# Patient Record
Sex: MALE | Race: WHITE | Employment: FULL TIME | RURAL
[De-identification: names, ages, dates, MRNs, and addresses within clinical notes are randomized per-mention and may not be internally consistent; named-entity substitution may affect disease eponyms.]

---

## 2021-06-10 ENCOUNTER — APPOINTMENT (OUTPATIENT)
Dept: GENERAL RADIOLOGY | Age: 66
DRG: 640 | End: 2021-06-10
Attending: INTERNAL MEDICINE
Payer: COMMERCIAL

## 2021-06-10 ENCOUNTER — APPOINTMENT (OUTPATIENT)
Dept: CT IMAGING | Age: 66
DRG: 640 | End: 2021-06-10
Attending: INTERNAL MEDICINE
Payer: COMMERCIAL

## 2021-06-10 ENCOUNTER — HOSPITAL ENCOUNTER (INPATIENT)
Age: 66
LOS: 4 days | Discharge: HOME | DRG: 640 | End: 2021-06-14
Attending: EMERGENCY MEDICINE | Admitting: INTERNAL MEDICINE
Payer: COMMERCIAL

## 2021-06-10 DIAGNOSIS — G25.0 BENIGN FAMILIAL TREMOR: Primary | ICD-10-CM

## 2021-06-10 DIAGNOSIS — E11.10 DIABETIC KETOACIDOSIS WITHOUT COMA ASSOCIATED WITH TYPE 2 DIABETES MELLITUS (HCC): ICD-10-CM

## 2021-06-10 DIAGNOSIS — K85.20 ALCOHOL-INDUCED ACUTE PANCREATITIS, UNSPECIFIED COMPLICATION STATUS: ICD-10-CM

## 2021-06-10 PROBLEM — F10.10 ETOH ABUSE: Status: ACTIVE | Noted: 2021-06-10

## 2021-06-10 PROBLEM — E11.9 TYPE 2 DIABETES MELLITUS, WITHOUT LONG-TERM CURRENT USE OF INSULIN (HCC): Chronic | Status: ACTIVE | Noted: 2021-06-10

## 2021-06-10 PROBLEM — E78.5 HYPERLIPIDEMIA: Chronic | Status: ACTIVE | Noted: 2021-06-10

## 2021-06-10 LAB
ALBUMIN SERPL-MCNC: 3.9 G/DL (ref 3.5–5)
ALBUMIN/GLOB SERPL: 1 {RATIO} (ref 1.1–2.2)
ALP SERPL-CCNC: 108 U/L (ref 45–117)
ALT SERPL-CCNC: 83 U/L (ref 12–78)
ANION GAP SERPL CALC-SCNC: 19 MMOL/L (ref 5–15)
ANION GAP SERPL CALC-SCNC: 21 MMOL/L (ref 5–15)
APPEARANCE UR: CLEAR
AST SERPL-CCNC: 83 U/L (ref 15–37)
BACTERIA URNS QL MICRO: NEGATIVE /HPF
BASE DEFICIT BLDV-SCNC: 4.6 MMOL/L
BASOPHILS # BLD: 0 K/UL (ref 0–0.1)
BASOPHILS NFR BLD: 0 % (ref 0–1)
BDY SITE: ABNORMAL
BILIRUB SERPL-MCNC: 1.9 MG/DL (ref 0.2–1)
BILIRUB UR QL CFM: NEGATIVE
BUN SERPL-MCNC: 21 MG/DL (ref 6–20)
BUN SERPL-MCNC: 21 MG/DL (ref 6–20)
BUN/CREAT SERPL: 17 (ref 12–20)
BUN/CREAT SERPL: 19 (ref 12–20)
CALCIUM SERPL-MCNC: 8.8 MG/DL (ref 8.5–10.1)
CALCIUM SERPL-MCNC: 9.7 MG/DL (ref 8.5–10.1)
CHLORIDE SERPL-SCNC: 90 MMOL/L (ref 97–108)
CHLORIDE SERPL-SCNC: 95 MMOL/L (ref 97–108)
CO2 SERPL-SCNC: 18 MMOL/L (ref 21–32)
CO2 SERPL-SCNC: 19 MMOL/L (ref 21–32)
COLOR UR: ABNORMAL
COMMENT, HOLDF: NORMAL
CREAT SERPL-MCNC: 1.08 MG/DL (ref 0.7–1.3)
CREAT SERPL-MCNC: 1.24 MG/DL (ref 0.7–1.3)
DIFFERENTIAL METHOD BLD: ABNORMAL
EOSINOPHIL # BLD: 0 K/UL (ref 0–0.4)
EOSINOPHIL NFR BLD: 0 % (ref 0–7)
EPITH CASTS URNS QL MICRO: ABNORMAL /LPF
ERYTHROCYTE [DISTWIDTH] IN BLOOD BY AUTOMATED COUNT: 12.3 % (ref 11.5–14.5)
EST. AVERAGE GLUCOSE BLD GHB EST-MCNC: 137 MG/DL
GLOBULIN SER CALC-MCNC: 4.1 G/DL (ref 2–4)
GLUCOSE BLD STRIP.AUTO-MCNC: 164 MG/DL (ref 65–117)
GLUCOSE BLD STRIP.AUTO-MCNC: 194 MG/DL (ref 65–117)
GLUCOSE BLD STRIP.AUTO-MCNC: 355 MG/DL (ref 65–117)
GLUCOSE BLD STRIP.AUTO-MCNC: 81 MG/DL (ref 65–117)
GLUCOSE SERPL-MCNC: 183 MG/DL (ref 65–100)
GLUCOSE SERPL-MCNC: 344 MG/DL (ref 65–100)
GLUCOSE UR STRIP.AUTO-MCNC: >1000 MG/DL
HBA1C MFR BLD: 6.4 % (ref 4–5.6)
HCO3 BLDV-SCNC: 18 MMOL/L (ref 23–28)
HCT VFR BLD AUTO: 38.3 % (ref 36.6–50.3)
HGB BLD-MCNC: 14.3 G/DL (ref 12.1–17)
HGB UR QL STRIP: ABNORMAL
IMM GRANULOCYTES # BLD AUTO: 0 K/UL (ref 0–0.04)
IMM GRANULOCYTES NFR BLD AUTO: 1 % (ref 0–0.5)
KETONES UR QL STRIP.AUTO: 40 MG/DL
LACTATE SERPL-SCNC: 2.3 MMOL/L (ref 0.4–2)
LEUKOCYTE ESTERASE UR QL STRIP.AUTO: NEGATIVE
LIPASE SERPL-CCNC: 1689 U/L (ref 73–393)
LYMPHOCYTES # BLD: 0.4 K/UL (ref 0.8–3.5)
LYMPHOCYTES NFR BLD: 5 % (ref 12–49)
MCH RBC QN AUTO: 39.3 PG (ref 26–34)
MCHC RBC AUTO-ENTMCNC: 37.3 G/DL (ref 30–36.5)
MCV RBC AUTO: 105.2 FL (ref 80–99)
MONOCYTES # BLD: 0.5 K/UL (ref 0–1)
MONOCYTES NFR BLD: 7 % (ref 5–13)
NEUTS SEG # BLD: 6.5 K/UL (ref 1.8–8)
NEUTS SEG NFR BLD: 88 % (ref 32–75)
NITRITE UR QL STRIP.AUTO: NEGATIVE
NRBC # BLD: 0 K/UL (ref 0–0.01)
NRBC BLD-RTO: 0 PER 100 WBC
PCO2 BLDV: 25.9 MMHG (ref 41–51)
PH BLDV: 7.45 [PH] (ref 7.32–7.42)
PH UR STRIP: 6 [PH] (ref 5–8)
PLATELET # BLD AUTO: 156 K/UL (ref 150–400)
PMV BLD AUTO: 9.8 FL (ref 8.9–12.9)
PO2 BLDV: 81 MMHG (ref 25–40)
POTASSIUM SERPL-SCNC: 3.9 MMOL/L (ref 3.5–5.1)
POTASSIUM SERPL-SCNC: 4.1 MMOL/L (ref 3.5–5.1)
PROT SERPL-MCNC: 8 G/DL (ref 6.4–8.2)
PROT UR STRIP-MCNC: >300 MG/DL
RBC # BLD AUTO: 3.64 M/UL (ref 4.1–5.7)
RBC #/AREA URNS HPF: ABNORMAL /HPF (ref 0–5)
SAMPLES BEING HELD,HOLD: NORMAL
SAO2 % BLDV: 97 % (ref 65–88)
SAO2% DEVICE SAO2% SENSOR NAME: ABNORMAL
SERVICE CMNT-IMP: ABNORMAL
SERVICE CMNT-IMP: NORMAL
SODIUM SERPL-SCNC: 129 MMOL/L (ref 136–145)
SODIUM SERPL-SCNC: 133 MMOL/L (ref 136–145)
SP GR UR REFRACTOMETRY: 1.02 (ref 1–1.03)
SPECIMEN SITE: ABNORMAL
TROPONIN I SERPL-MCNC: <0.05 NG/ML
UROBILINOGEN UR QL STRIP.AUTO: 1 EU/DL (ref 0.2–1)
WBC # BLD AUTO: 7.4 K/UL (ref 4.1–11.1)
WBC URNS QL MICRO: ABNORMAL /HPF (ref 0–4)

## 2021-06-10 PROCEDURE — 80053 COMPREHEN METABOLIC PANEL: CPT

## 2021-06-10 PROCEDURE — 74011636637 HC RX REV CODE- 636/637: Performed by: EMERGENCY MEDICINE

## 2021-06-10 PROCEDURE — 84484 ASSAY OF TROPONIN QUANT: CPT

## 2021-06-10 PROCEDURE — 71046 X-RAY EXAM CHEST 2 VIEWS: CPT

## 2021-06-10 PROCEDURE — 85025 COMPLETE CBC W/AUTO DIFF WBC: CPT

## 2021-06-10 PROCEDURE — 74011250637 HC RX REV CODE- 250/637: Performed by: INTERNAL MEDICINE

## 2021-06-10 PROCEDURE — 82803 BLOOD GASES ANY COMBINATION: CPT

## 2021-06-10 PROCEDURE — 82010 KETONE BODYS QUAN: CPT

## 2021-06-10 PROCEDURE — 93005 ELECTROCARDIOGRAM TRACING: CPT

## 2021-06-10 PROCEDURE — 83605 ASSAY OF LACTIC ACID: CPT

## 2021-06-10 PROCEDURE — 99284 EMERGENCY DEPT VISIT MOD MDM: CPT

## 2021-06-10 PROCEDURE — 83036 HEMOGLOBIN GLYCOSYLATED A1C: CPT

## 2021-06-10 PROCEDURE — 83690 ASSAY OF LIPASE: CPT

## 2021-06-10 PROCEDURE — 96361 HYDRATE IV INFUSION ADD-ON: CPT

## 2021-06-10 PROCEDURE — 96375 TX/PRO/DX INJ NEW DRUG ADDON: CPT

## 2021-06-10 PROCEDURE — 96374 THER/PROPH/DIAG INJ IV PUSH: CPT

## 2021-06-10 PROCEDURE — 74011250636 HC RX REV CODE- 250/636: Performed by: INTERNAL MEDICINE

## 2021-06-10 PROCEDURE — 82962 GLUCOSE BLOOD TEST: CPT

## 2021-06-10 PROCEDURE — 36415 COLL VENOUS BLD VENIPUNCTURE: CPT

## 2021-06-10 PROCEDURE — 65270000029 HC RM PRIVATE

## 2021-06-10 PROCEDURE — 74176 CT ABD & PELVIS W/O CONTRAST: CPT

## 2021-06-10 PROCEDURE — 74011636637 HC RX REV CODE- 636/637: Performed by: INTERNAL MEDICINE

## 2021-06-10 PROCEDURE — 81001 URINALYSIS AUTO W/SCOPE: CPT

## 2021-06-10 PROCEDURE — 74011250636 HC RX REV CODE- 250/636: Performed by: EMERGENCY MEDICINE

## 2021-06-10 RX ORDER — ALPRAZOLAM 0.5 MG/1
0.5 TABLET ORAL EVERY 8 HOURS
Status: DISCONTINUED | OUTPATIENT
Start: 2021-06-10 | End: 2021-06-12

## 2021-06-10 RX ORDER — ACETAMINOPHEN 325 MG/1
650 TABLET ORAL
Status: DISCONTINUED | OUTPATIENT
Start: 2021-06-10 | End: 2021-06-14 | Stop reason: HOSPADM

## 2021-06-10 RX ORDER — SODIUM CHLORIDE 0.9 % (FLUSH) 0.9 %
5-40 SYRINGE (ML) INJECTION EVERY 8 HOURS
Status: DISCONTINUED | OUTPATIENT
Start: 2021-06-10 | End: 2021-06-14 | Stop reason: HOSPADM

## 2021-06-10 RX ORDER — PROPRANOLOL HYDROCHLORIDE 10 MG/1
10 TABLET ORAL 2 TIMES DAILY
Status: DISCONTINUED | OUTPATIENT
Start: 2021-06-10 | End: 2021-06-14 | Stop reason: HOSPADM

## 2021-06-10 RX ORDER — ACETAMINOPHEN 650 MG/1
650 SUPPOSITORY RECTAL
Status: DISCONTINUED | OUTPATIENT
Start: 2021-06-10 | End: 2021-06-14 | Stop reason: HOSPADM

## 2021-06-10 RX ORDER — POLYETHYLENE GLYCOL 3350 17 G/17G
17 POWDER, FOR SOLUTION ORAL DAILY PRN
Status: DISCONTINUED | OUTPATIENT
Start: 2021-06-10 | End: 2021-06-14 | Stop reason: HOSPADM

## 2021-06-10 RX ORDER — SODIUM CHLORIDE 0.9 % (FLUSH) 0.9 %
5-40 SYRINGE (ML) INJECTION AS NEEDED
Status: DISCONTINUED | OUTPATIENT
Start: 2021-06-10 | End: 2021-06-14 | Stop reason: HOSPADM

## 2021-06-10 RX ORDER — LOSARTAN POTASSIUM AND HYDROCHLOROTHIAZIDE 25; 100 MG/1; MG/1
TABLET ORAL
COMMUNITY
Start: 2021-05-20 | End: 2021-06-14

## 2021-06-10 RX ORDER — LORAZEPAM 2 MG/ML
0.5 INJECTION INTRAMUSCULAR
Status: COMPLETED | OUTPATIENT
Start: 2021-06-10 | End: 2021-06-10

## 2021-06-10 RX ORDER — FENOFIBRATE 145 MG/1
145 TABLET, COATED ORAL DAILY
Status: DISCONTINUED | OUTPATIENT
Start: 2021-06-11 | End: 2021-06-14 | Stop reason: HOSPADM

## 2021-06-10 RX ORDER — MAGNESIUM SULFATE 100 %
4 CRYSTALS MISCELLANEOUS AS NEEDED
Status: DISCONTINUED | OUTPATIENT
Start: 2021-06-10 | End: 2021-06-14 | Stop reason: HOSPADM

## 2021-06-10 RX ORDER — ONDANSETRON 2 MG/ML
4 INJECTION INTRAMUSCULAR; INTRAVENOUS
Status: DISCONTINUED | OUTPATIENT
Start: 2021-06-10 | End: 2021-06-14 | Stop reason: HOSPADM

## 2021-06-10 RX ORDER — SODIUM CHLORIDE 9 MG/ML
150 INJECTION, SOLUTION INTRAVENOUS CONTINUOUS
Status: DISCONTINUED | OUTPATIENT
Start: 2021-06-10 | End: 2021-06-11

## 2021-06-10 RX ORDER — GLYBURIDE-METFORMIN HYDROCHLORIDE 5; 500 MG/1; MG/1
TABLET ORAL
COMMUNITY
End: 2021-06-14

## 2021-06-10 RX ORDER — ONDANSETRON 2 MG/ML
4 INJECTION INTRAMUSCULAR; INTRAVENOUS
Status: COMPLETED | OUTPATIENT
Start: 2021-06-10 | End: 2021-06-10

## 2021-06-10 RX ORDER — ENOXAPARIN SODIUM 100 MG/ML
40 INJECTION SUBCUTANEOUS DAILY
Status: DISCONTINUED | OUTPATIENT
Start: 2021-06-11 | End: 2021-06-14

## 2021-06-10 RX ORDER — FAMOTIDINE 20 MG/1
20 TABLET, FILM COATED ORAL EVERY EVENING
Status: DISCONTINUED | OUTPATIENT
Start: 2021-06-10 | End: 2021-06-14 | Stop reason: HOSPADM

## 2021-06-10 RX ORDER — INSULIN LISPRO 100 [IU]/ML
INJECTION, SOLUTION INTRAVENOUS; SUBCUTANEOUS
Status: DISCONTINUED | OUTPATIENT
Start: 2021-06-10 | End: 2021-06-14 | Stop reason: HOSPADM

## 2021-06-10 RX ORDER — ESCITALOPRAM OXALATE 10 MG/1
TABLET ORAL
Status: ON HOLD | COMMUNITY
Start: 2021-06-08 | End: 2021-06-14 | Stop reason: SDUPTHER

## 2021-06-10 RX ORDER — PROPRANOLOL HYDROCHLORIDE 10 MG/1
TABLET ORAL
COMMUNITY
End: 2021-06-14

## 2021-06-10 RX ORDER — ALPRAZOLAM 0.5 MG/1
TABLET ORAL
Status: ON HOLD | COMMUNITY
End: 2021-06-14 | Stop reason: SDUPTHER

## 2021-06-10 RX ORDER — DEXTROSE 50 % IN WATER (D50W) INTRAVENOUS SYRINGE
25-50 AS NEEDED
Status: DISCONTINUED | OUTPATIENT
Start: 2021-06-10 | End: 2021-06-14 | Stop reason: HOSPADM

## 2021-06-10 RX ORDER — FLASH GLUCOSE SENSOR
KIT MISCELLANEOUS
COMMUNITY
Start: 2021-04-19

## 2021-06-10 RX ORDER — ATORVASTATIN CALCIUM 20 MG/1
20 TABLET, FILM COATED ORAL
Status: DISCONTINUED | OUTPATIENT
Start: 2021-06-10 | End: 2021-06-14 | Stop reason: HOSPADM

## 2021-06-10 RX ORDER — TADALAFIL 5 MG/1
TABLET ORAL
COMMUNITY
Start: 2021-04-02 | End: 2021-06-14

## 2021-06-10 RX ADMIN — HUMAN INSULIN 8 UNITS: 100 INJECTION, SOLUTION SUBCUTANEOUS at 12:10

## 2021-06-10 RX ADMIN — LORAZEPAM 0.5 MG: 2 INJECTION INTRAMUSCULAR; INTRAVENOUS at 11:25

## 2021-06-10 RX ADMIN — PROPRANOLOL HYDROCHLORIDE 10 MG: 10 TABLET ORAL at 17:23

## 2021-06-10 RX ADMIN — SODIUM CHLORIDE 150 ML/HR: 9 INJECTION, SOLUTION INTRAVENOUS at 22:06

## 2021-06-10 RX ADMIN — FAMOTIDINE 20 MG: 20 TABLET ORAL at 17:23

## 2021-06-10 RX ADMIN — ALPRAZOLAM 0.5 MG: 0.5 TABLET ORAL at 21:56

## 2021-06-10 RX ADMIN — SODIUM CHLORIDE 1000 ML: 9 INJECTION, SOLUTION INTRAVENOUS at 11:25

## 2021-06-10 RX ADMIN — SODIUM CHLORIDE 1000 ML: 9 INJECTION, SOLUTION INTRAVENOUS at 12:11

## 2021-06-10 RX ADMIN — SODIUM CHLORIDE 150 ML/HR: 9 INJECTION, SOLUTION INTRAVENOUS at 16:09

## 2021-06-10 RX ADMIN — INSULIN LISPRO 2 UNITS: 100 INJECTION, SOLUTION INTRAVENOUS; SUBCUTANEOUS at 17:23

## 2021-06-10 RX ADMIN — ONDANSETRON 4 MG: 2 INJECTION INTRAMUSCULAR; INTRAVENOUS at 11:25

## 2021-06-10 RX ADMIN — ONDANSETRON 4 MG: 2 INJECTION INTRAMUSCULAR; INTRAVENOUS at 20:10

## 2021-06-10 RX ADMIN — ATORVASTATIN CALCIUM 20 MG: 20 TABLET, FILM COATED ORAL at 21:56

## 2021-06-10 RX ADMIN — ALPRAZOLAM 0.5 MG: 0.5 TABLET ORAL at 17:01

## 2021-06-10 NOTE — ED PROVIDER NOTES
EMERGENCY DEPARTMENT HISTORY AND PHYSICAL EXAM      Date: 6/10/2021  Patient Name: Tobi Fabian    History of Presenting Illness     Chief Complaint   Patient presents with    Vomiting       History Provided By: Patient    HPI: Tobi Fabian, 72 y.o. male with PMHx significant for DM 2, hypertension, high cholesterol, presents ambulatory to the ED with cc of nausea and vomiting. He reports he has been vomiting every few hours for the past 2 days. Reports he has barely been able to keep liquids down. He has not been able to keep any of his medications down. Reports generalized weakness. He denies any chest pain or shortness of breath. He denies any abdominal pain. He does report some diarrhea. Thinks symptoms might of been related to eating some bad tuna fish. Denies any fevers or chills. No known sick contacts. He reports he normally lives in [place] but he moved down here at the start of the Covid pandemic. He reports since the start of the Covid pandemic his drinking has increased greatly. He reports drinking a half a fifth to a fifth of liquor a day. He denies any prior history of withdrawal symptoms or seizures. PMHx: Significant for M2, hypertension  PSHx: Significant for knee replacement. Nasal surgery. Social Hx: Quarter pack a day smoker with occasional alcohol use. There are no other complaints, changes, or physical findings at this time. PCP: Eris, Not On File, NP    No current facility-administered medications on file prior to encounter.      Current Outpatient Medications on File Prior to Encounter   Medication Sig Dispense Refill    ALPRAZolam (XANAX) 0.5 mg tablet 1/2 tab(s)      propranoloL (INDERAL) 10 mg tablet 1 tab(s)      escitalopram oxalate (LEXAPRO) 10 mg tablet       FreeStyle Marita 14 Day Sensor kit USE AS DIRECTED EVERY 14 DAYS      tadalafiL (CIALIS) 5 mg tablet TAKE 1 TABLET BY MOUTH EVERY DAY      losartan-hydroCHLOROthiazide (HYZAAR) 100-25 mg per tablet TAKE 1 TABLET BY MOUTH EVERY DAY      glyBURIDE-metFORMIN (GLUCOVANCE) 5-500 mg per tablet 1 tab(s)      propranolol (INDERAL) 10 mg tablet Take 10 mg by mouth two (2) times a day.  fenofibrate micronized (LOFIBRA) 134 mg capsule Take 134 mg by mouth every morning.  losartan-hydroCHLOROthiazide (HYZAAR) 50-12.5 mg per tablet Take 1 Tab by mouth daily. (Patient not taking: Reported on 6/10/2021)      rosuvastatin (CRESTOR) 10 mg tablet Take 10 mg by mouth nightly.  glyBURIDE-metFORMIN (GLUCOVANCE) 5-500 mg per tablet Take 2 Tabs by mouth two (2) times daily (with meals).  [DISCONTINUED] ezetimibe (ZETIA) 10 mg tablet Take 10 mg by mouth.  [DISCONTINUED] ciprofloxacin HCl (CIPRO) 250 mg tablet Take 1 Tab by mouth every twelve (12) hours. 10 Tab 0       Past History     Past Medical History:  Past Medical History:   Diagnosis Date    Diabetes ([facility] [address]     Hypertension     Ill-defined condition 20 years ago    high cholesterol       Past Surgical History:  Past Surgical History:   Procedure Laterality Date    AK CARDIAC SURG PROCEDURE UNLIST      skin       Family History:  Family History   Problem Relation Age of Onset   Tori Vazquez Arthritis-osteo Mother     Heart Disease Sister     Prostate Cancer Brother     Cancer Maternal Uncle     Cancer Paternal Aunt        Social History:  Social History     Tobacco Use    Smoking status: Current Every Day Smoker     Packs/day: 0.25     Years: 25.00     Pack years: 6.25    Smokeless tobacco: Never Used    Tobacco comment: staff to educate   Vaping Use    Vaping Use: Never used   Substance Use Topics    Alcohol use: Yes     Comment: 1/2 to 1/5 Vodka daily    Drug use: Never       Allergies:  No Known Allergies      Review of Systems   Review of Systems   Constitutional: Positive for fatigue. Negative for activity change, chills and fever. HENT: Negative for congestion and sore throat. Eyes: Negative for pain and redness.    Respiratory: Negative for cough, chest tightness and shortness of breath. Cardiovascular: Negative for chest pain and palpitations. Gastrointestinal: Positive for diarrhea, nausea and vomiting. Negative for abdominal pain. Genitourinary: Negative for dysuria, frequency and urgency. Musculoskeletal: Negative for back pain and neck pain. Skin: Negative for rash. Neurological: Negative for syncope, light-headedness and headaches. Psychiatric/Behavioral: Negative for confusion. All other systems reviewed and are negative. Physical Exam   Physical Exam  Vitals and nursing note reviewed. Constitutional:       General: He is not in acute distress. Appearance: Normal appearance. He is well-developed. He is not toxic-appearing or diaphoretic. Comments: Calm and pleasant  male. HENT:      Head: Normocephalic. Nose: Nose normal.      Mouth/Throat:      Pharynx: No oropharyngeal exudate. Eyes:      General: No scleral icterus. Conjunctiva/sclera: Conjunctivae normal.      Pupils: Pupils are equal, round, and reactive to light. Neck:      Thyroid: No thyromegaly. Vascular: No JVD. Trachea: No tracheal deviation. Cardiovascular:      Rate and Rhythm: Normal rate and regular rhythm. Heart sounds: No murmur heard. No friction rub. No gallop. Pulmonary:      Effort: Pulmonary effort is normal. No respiratory distress. Breath sounds: Normal breath sounds. No stridor. No wheezing or rales. Abdominal:      General: Bowel sounds are normal. There is no distension. Palpations: Abdomen is soft. Tenderness: There is no abdominal tenderness. There is no guarding or rebound. Musculoskeletal:         General: Normal range of motion. Cervical back: Normal range of motion and neck supple. Lymphadenopathy:      Cervical: No cervical adenopathy. Skin:     General: Skin is warm and dry. Capillary Refill: Capillary refill takes less than 2 seconds. Findings: No erythema or rash. Neurological:      General: No focal deficit present. Mental Status: He is alert and oriented to person, place, and time. Cranial Nerves: No cranial nerve deficit. Motor: No abnormal muscle tone. Coordination: Coordination normal.      Comments: 9 resting tremor in bilateral upper extremities. Psychiatric:         Behavior: Behavior normal.             Diagnostic Study Results     Labs -     Recent Results (from the past 12 hour(s))   GLUCOSE, POC    Collection Time: 06/10/21 10:57 AM   Result Value Ref Range    Glucose (POC) 355 (H) 65 - 117 mg/dL    Performed by Danette Denton (JUAN ANTONIO)    CBC WITH AUTOMATED DIFF    Collection Time: 06/10/21 11:08 AM   Result Value Ref Range    WBC 7.4 4.1 - 11.1 K/uL    RBC 3.64 (L) 4.10 - 5.70 M/uL    HGB 14.3 12.1 - 17.0 g/dL    HCT 38.3 36.6 - 50.3 %    .2 (H) 80.0 - 99.0 FL    MCH 39.3 (H) 26.0 - 34.0 PG    MCHC 37.3 (H) 30.0 - 36.5 g/dL    RDW 12.3 11.5 - 14.5 %    PLATELET 178 940 - 382 K/uL    MPV 9.8 8.9 - 12.9 FL    NRBC 0.0 0  WBC    ABSOLUTE NRBC 0.00 0.00 - 0.01 K/uL    NEUTROPHILS 88 (H) 32 - 75 %    LYMPHOCYTES 5 (L) 12 - 49 %    MONOCYTES 7 5 - 13 %    EOSINOPHILS 0 0 - 7 %    BASOPHILS 0 0 - 1 %    IMMATURE GRANULOCYTES 1 (H) 0.0 - 0.5 %    ABS. NEUTROPHILS 6.5 1.8 - 8.0 K/UL    ABS. LYMPHOCYTES 0.4 (L) 0.8 - 3.5 K/UL    ABS. MONOCYTES 0.5 0.0 - 1.0 K/UL    ABS. EOSINOPHILS 0.0 0.0 - 0.4 K/UL    ABS. BASOPHILS 0.0 0.0 - 0.1 K/UL    ABS. IMM.  GRANS. 0.0 0.00 - 0.04 K/UL    DF AUTOMATED     METABOLIC PANEL, COMPREHENSIVE    Collection Time: 06/10/21 11:08 AM   Result Value Ref Range    Sodium 129 (L) 136 - 145 mmol/L    Potassium 4.1 3.5 - 5.1 mmol/L    Chloride 90 (L) 97 - 108 mmol/L    CO2 18 (L) 21 - 32 mmol/L    Anion gap 21 (H) 5 - 15 mmol/L    Glucose 344 (H) 65 - 100 mg/dL    BUN 21 (H) 6 - 20 MG/DL    Creatinine 1.24 0.70 - 1.30 MG/DL    BUN/Creatinine ratio 17 12 - 20      GFR est AA >60 >60 ml/min/1.73m2    GFR est non-AA 59 (L) >60 ml/min/1.73m2    Calcium 9.7 8.5 - 10.1 MG/DL    Bilirubin, total 1.9 (H) 0.2 - 1.0 MG/DL    ALT (SGPT) 83 (H) 12 - 78 U/L    AST (SGOT) 83 (H) 15 - 37 U/L    Alk. phosphatase 108 45 - 117 U/L    Protein, total 8.0 6.4 - 8.2 g/dL    Albumin 3.9 3.5 - 5.0 g/dL    Globulin 4.1 (H) 2.0 - 4.0 g/dL    A-G Ratio 1.0 (L) 1.1 - 2.2     LIPASE    Collection Time: 06/10/21 11:08 AM   Result Value Ref Range    Lipase 1,689 (H) 73 - 393 U/L   TROPONIN I    Collection Time: 06/10/21 11:08 AM   Result Value Ref Range    Troponin-I, Qt. <0.05 <0.05 ng/mL   SAMPLES BEING HELD    Collection Time: 06/10/21 11:08 AM   Result Value Ref Range    SAMPLES BEING HELD  1 SST, 1 RED, 1 BLUE     COMMENT        Add-on orders for these samples will be processed based on acceptable specimen integrity and analyte stability, which may vary by analyte.    EKG, 12 LEAD, INITIAL    Collection Time: 06/10/21 11:10 AM   Result Value Ref Range    Ventricular Rate 0 BPM    Atrial Rate 0 BPM    QRS Duration 0 ms    Q-T Interval 0 ms    QTC Calculation (Bezet) 0 ms    Calculated R Axis 0 degrees    Calculated T Axis 0 degrees    Diagnosis       ** No QRS complexes found, no ECG analysis possible **  No previous ECGs available     BLOOD GAS, VENOUS    Collection Time: 06/10/21 11:40 AM   Result Value Ref Range    VENOUS PH 7.45 (H) 7.32 - 7.42      VENOUS PCO2 25.9 (L) 41 - 51 mmHg    VENOUS PO2 81 (H) 25 - 40 mmHg    VENOUS BICARBONATE 18 (L) 23 - 28 mmol/L    VENOUS BASE DEFICIT 4.6 mmol/L    VENOUS O2 SATURATION 97 (H) 65 - 88 %    O2 METHOD ROOM AIR      Sample source VENOUS BLOOD      SITE OTHER     GLUCOSE, POC    Collection Time: 06/10/21  1:24 PM   Result Value Ref Range    Glucose (POC) 164 (H) 65 - 117 mg/dL    Performed by Herchel Resides        Radiologic Studies -   No orders to display     CT Results  (Last 48 hours)    None        CXR Results  (Last 48 hours)    None            Medical Decision Making I am the first provider for this patient. I reviewed the vital signs, available nursing notes, past medical history, past surgical history, family history and social history. Vital Signs-Reviewed the patient's vital signs. Patient Vitals for the past 12 hrs:   Temp Pulse Resp BP SpO2   06/10/21 1330    (!) 153/88 99 %   06/10/21 1056 98 °F (36.7 °C) 88 20 (!) 173/108 98 %       Pulse Oximetry Analysis - 99% on RA    Cardiac Monitor:   Rate: 88 bpm  Rhythm: Normal Sinus Rhythm        ED EKG interpretation:11:14  Rhythm: sinus tachycardia; and regular . Rate (approx.): 101; Axis: normal; CA Interval: normal; QRS interval: normal ; ST/T wave: normal; This EKG was interpreted by Nadine Faria MD,ED Provider. Records Reviewed: Nursing Notes and Old Medical Records    Provider Notes (Medical Decision Making):   DDx: DKA, pancreatitis, ACS, gastroenteritis, metabolic abnormality. ED Course:   Initial assessment performed. The patients presenting problems have been discussed, and they are in agreement with the care plan formulated and outlined with them. I have encouraged them to ask questions as they arise throughout their visit. ED Course as of Venkat 10 1403   Thu Venkat 10, 2021   1155 Patient with acute pancreatitis with mild DKA versus alcoholic ketoacidosis. .  Anion gap of 21. Sugar 344. CO2 of 18. Continuing IV fluid resuscitation with normal saline. Starting low-dose insulin drip. Consulted hospitalist Dr. Debbrah Gilford.    [CH]   21  Initially suspected DKA. And ordered insulin drip. VBG obtained. Patient is actually not acidotic with pH of 7.45. Will give IV insulin bolus and reassess.    [CH]      ED Course User Index  [CH] Christian Thorne MD         PROGRESS NOTE    Pt reevaluated. Patient feeling much better after Zofran and Ativan. Patient reports a chronic resting tremor which she takes propranolol for.   Possibility of alcohol withdrawal does exist due to patient's quantity of daily drinking. Lipase elevated at 1689. No abdominal pain. No indication for CT imaging at this point. Negative troponin. Will admit to hospitalist for DKA versus alcoholic ketoacidosis and acute pancreatitis. Written by Javier Douglas MD     Progress note:            Critical Care Time:   0    Disposition:  Admit    PLAN:  1. Current Discharge Medication List        2. Follow-up Information    None       Return to ED if worse     Diagnosis     Clinical Impression:   1. Diabetic ketoacidosis without coma associated with type 2 diabetes mellitus ([facility] [address]    2. Alcohol-induced acute pancreatitis, unspecified complication status              Please note that this dictation was completed with [employer], the [employer] voice recognition software. Quite often unanticipated grammatical, syntax, homophones, and other interpretive errors are inadvertently transcribed by the computer software. Please disregard these errors. Please excuse any errors that have escaped final proofreading.

## 2021-06-10 NOTE — PROGRESS NOTES
Tolerated clear liquid diet with no nausea noted, Tremors have decreased greatly since his inderal and xanax was given. Had clear liquid diet and is having some slight nausea since dinner, ate chicken broth and some jello. Catia Mccabe RN aware.

## 2021-06-10 NOTE — H&P
[facility]   Admission History & Physical        6/10/2021 1:21 PM  Patient: Jv Prieto 1955  PCP: Renetta Andrews, Not On File, NP    HISTORY  Chief Complaint:   Chief Complaint   Patient presents with    Vomiting       HPI: 72 y.o. male presenting for admission to [facility] for further evaluation and treatment for DKA (diabetic ketoacidoses) ([facility] [address] He  has a past medical history of Diabetes ([facility] [address] and Hypertension. Juan Ramon Prost He to the ED with complaints of weakness and symptoms of nausea and vomiting over the past 48 hours. He states he has not been able to tolerate any of his medications or nutrition. He has retained some liquids but no medicines. He has generalized weakness. He denies any chest pain or shortness of breath. He denies any severe abdominal pain. He has noted some loose stooling. Questions whether his symptoms began after eating some \"bad\" tunafish. He has not experienced any fevers or chills and has had no ill contacts. He does admit to drinking to some excess over the past few monthsdrinking 1/2-1 full fifth of liquor daily. There is no prior history of alcohol withdrawal, DTs or seizures. Assessment in the ED was remarkable for a anion gap metabolic acidosis. He has been a diabetic for 20 years on oral therapy. Reports fasting glucose use low 100s. He has recently been taking glyburide 5 mg daily as well as Metformin 500 mg daily. He rarely takes a double morning dose when his fasting glucose is over 175. He has recorded blood sugars up to 350 today. He was alarmed by his blood sugars increasing in spite of no nutritional intake. ED labs were remarkable for an elevated serum lipase. He has no prior history of known pancreatitis. CBC was remarkable for a normal white count. Received 8 units of Humulin R along with 2000 cc of saline in the ED. A follow-up basic panel is pending. Serum lipase and hydroxybutyrate are pending.     Patient [facility] He has not had follow-up during the past year due to [facility] telemetry visits. Not had recent lab work. He is unaware of his A1c value. Past Medical History:  Past Medical History:   Diagnosis Date    Diabetes ([address]     Hypertension        Past Surgical History:  Past Surgical History:   Procedure Laterality Date    RI CARDIAC SURG PROCEDURE UNLIST      skin   Left knee surgery  TURP procedure for prostatism    Medication:  Prior to Admission medications    Medication Sig Start Date End Date Taking? Authorizing Provider   propranolol (INDERAL) 10 mg tablet Take 10 mg by mouth two (2) times a day. Jimmy Blake MD   fenofibrate micronized (LOFIBRA) 134 mg capsule Take 134 mg by mouth every morning. Jimmy Blake MD   losartan-hydroCHLOROthiazide (HYZAAR) 50-12.5 mg per tablet Take 1 Tab by mouth daily. Patient not taking: Reported on 6/10/2021    Jimmy Blake MD   rosuvastatin (CRESTOR) 10 mg tablet Take 10 mg by mouth nightly. Jimmy Blake MD   glyBURIDE-metFORMIN (GLUCOVANCE) 5-500 mg per tablet Take 2 Tabs by mouth two (2) times daily (with meals). Jimmy Blake MD   ciprofloxacin HCl (CIPRO) 250 mg tablet Take 1 Tab by mouth every twelve (12) hours. 5/1/18   Twin Nelson MD   Not taking tx Hyzaar following fainting event this year, was not replaced however    Allergies:  No Known Allergies    Social History:  Social History     Tobacco Use    Smoking status: Current Every Day Smoker     Packs/day: 0.25    Smokeless tobacco: Never Used   Vaping Use    Vaping Use: Never used   Substance Use Topics    Alcohol use: Yes    Drug use: Never       Family History:  History reviewed. No pertinent family history.   + tremor    ROS:  Total of 12 systems reviewed as follows:  POSITIVE= bolded text  Negative = text not bolded       General:  fever, chills, sweats, generalized weakness, weight loss/gain, loss of appetite   Eyes:    blurred vision, eye pain, loss of vision, double vision  ENT:    rhinorrhea, pharyngitis   Respiratory:  cough, sputum production, SOB, HSIEH, wheezing, pleuritic pain   Cardiology:   chest pain, palpitations, orthopnea, PND, edema, syncope   Gastrointestinal:  abdominal pain , N/V, diarrhea, dysphagia, constipation, bleeding   Genitourinary:  frequency, urgency, dysuria, hematuria, incontinence, prostatism   Muskuloskeletal: arthralgia, myalgia, back pain  Hematology:   easy bruising, nose or gum bleeding, lymphadenopathy   Dermatological: rash, ulceration, pruritis, color change / jaundice  Endocrine:   hot flashes or polydipsia   Neurological:  headache, dizziness, confusion, focal weakness, paresthesia, speech difficulties, memory loss, gait difficulty  Psychological: feelings of anxiety, depression, agitation      PHYSICAL EXAM:  Patient Vitals for the past 24 hrs:   Temp Pulse Resp BP SpO2   06/10/21 1056 98 °F (36.7 °C) 88 20 (!) 173/108 98 %       General:    Alert, cooperative, no distress, appears stated age. HEENT: Atraumatic, anicteric sclerae, pink conjunctivae     No oral ulcers, mucosa moist, throat clear, dentition fair  Neck:  Supple, symmetrical;   thyroid non tender  Lungs:   Clear to auscultation bilaterally. No wheezing or rhonchi. No rales. Chest wall:  No tenderness. No accessory muscle use. Heart:   Regular rhythm. No  murmur. No edema  Abdomen:   Soft, Mildly tender. Not distended. Bowel sounds normal  Extremities: No cyanosis. No clubbing      Capillary refill normal,  Radial pulse 2+,  DP  1+  Skin:     Not pale. Not jaundiced. No rashes   Psych:  Not depressed. Not anxious or agitated. Neurologic: EOMs intact. No facial asymmetry. No aphasia or slurred speech. Symmetrical strength, Sensation grossly intact.  Alert and oriented    B UE fine tremor    Lab Data Reviewed:    Recent Results (from the past 24 hour(s))   GLUCOSE, POC    Collection Time: 06/10/21 10:57 AM   Result Value Ref Range Glucose (POC) 355 (H) 65 - 117 mg/dL    Performed by Gavin LOGAN)    CBC WITH AUTOMATED DIFF    Collection Time: 06/10/21 11:08 AM   Result Value Ref Range    WBC 7.4 4.1 - 11.1 K/uL    RBC 3.64 (L) 4.10 - 5.70 M/uL    HGB 14.3 12.1 - 17.0 g/dL    HCT 38.3 36.6 - 50.3 %    .2 (H) 80.0 - 99.0 FL    MCH 39.3 (H) 26.0 - 34.0 PG    MCHC 37.3 (H) 30.0 - 36.5 g/dL    RDW 12.3 11.5 - 14.5 %    PLATELET 449 996 - 652 K/uL    MPV 9.8 8.9 - 12.9 FL    NRBC 0.0 0  WBC    ABSOLUTE NRBC 0.00 0.00 - 0.01 K/uL    NEUTROPHILS 88 (H) 32 - 75 %    LYMPHOCYTES 5 (L) 12 - 49 %    MONOCYTES 7 5 - 13 %    EOSINOPHILS 0 0 - 7 %    BASOPHILS 0 0 - 1 %    IMMATURE GRANULOCYTES 1 (H) 0.0 - 0.5 %    ABS. NEUTROPHILS 6.5 1.8 - 8.0 K/UL    ABS. LYMPHOCYTES 0.4 (L) 0.8 - 3.5 K/UL    ABS. MONOCYTES 0.5 0.0 - 1.0 K/UL    ABS. EOSINOPHILS 0.0 0.0 - 0.4 K/UL    ABS. BASOPHILS 0.0 0.0 - 0.1 K/UL    ABS. IMM. GRANS. 0.0 0.00 - 0.04 K/UL    DF AUTOMATED     METABOLIC PANEL, COMPREHENSIVE    Collection Time: 06/10/21 11:08 AM   Result Value Ref Range    Sodium 129 (L) 136 - 145 mmol/L    Potassium 4.1 3.5 - 5.1 mmol/L    Chloride 90 (L) 97 - 108 mmol/L    CO2 18 (L) 21 - 32 mmol/L    Anion gap 21 (H) 5 - 15 mmol/L    Glucose 344 (H) 65 - 100 mg/dL    BUN 21 (H) 6 - 20 MG/DL    Creatinine 1.24 0.70 - 1.30 MG/DL    BUN/Creatinine ratio 17 12 - 20      GFR est AA >60 >60 ml/min/1.73m2    GFR est non-AA 59 (L) >60 ml/min/1.73m2    Calcium 9.7 8.5 - 10.1 MG/DL    Bilirubin, total 1.9 (H) 0.2 - 1.0 MG/DL    ALT (SGPT) 83 (H) 12 - 78 U/L    AST (SGOT) 83 (H) 15 - 37 U/L    Alk.  phosphatase 108 45 - 117 U/L    Protein, total 8.0 6.4 - 8.2 g/dL    Albumin 3.9 3.5 - 5.0 g/dL    Globulin 4.1 (H) 2.0 - 4.0 g/dL    A-G Ratio 1.0 (L) 1.1 - 2.2     LIPASE    Collection Time: 06/10/21 11:08 AM   Result Value Ref Range    Lipase 1,689 (H) 73 - 393 U/L   TROPONIN I    Collection Time: 06/10/21 11:08 AM   Result Value Ref Range    Troponin-I, Qt. <0.05 <0.05 ng/mL   SAMPLES BEING HELD    Collection Time: 06/10/21 11:08 AM   Result Value Ref Range    SAMPLES BEING HELD  1 SST, 1 RED, 1 BLUE     COMMENT        Add-on orders for these samples will be processed based on acceptable specimen integrity and analyte stability, which may vary by analyte. EKG, 12 LEAD, INITIAL    Collection Time: 06/10/21 11:10 AM   Result Value Ref Range    Ventricular Rate 0 BPM    Atrial Rate 0 BPM    QRS Duration 0 ms    Q-T Interval 0 ms    QTC Calculation (Bezet) 0 ms    Calculated R Axis 0 degrees    Calculated T Axis 0 degrees    Diagnosis       ** No QRS complexes found, no ECG analysis possible **  No previous ECGs available     BLOOD GAS, VENOUS    Collection Time: 06/10/21 11:40 AM   Result Value Ref Range    VENOUS PH 7.45 (H) 7.32 - 7.42      VENOUS PCO2 25.9 (L) 41 - 51 mmHg    VENOUS PO2 81 (H) 25 - 40 mmHg    VENOUS BICARBONATE 18 (L) 23 - 28 mmol/L    VENOUS BASE DEFICIT 4.6 mmol/L    VENOUS O2 SATURATION 97 (H) 65 - 88 %    O2 METHOD ROOM AIR      Sample source VENOUS BLOOD      SITE OTHER         EKG:    Radiology:  PA/LAT CXR:     CT ABD:        Care Plan discussed with:   Patient x    Family     RN x         Consultant      Expected  Disposition:   Home with Family x   HH/PT/OT/RN    SNF/LTC    JAY JAY      TOTAL TIME:  60 Minutes      Comments    x Reviewed previous records   >50% of visit spent in counseling and coordination of care x Discussion with patient and/or family and questions answered       _______________________________________________________  Given the patient's current clinical presentation, I have a high level of concern for decompensation if discharged from the emergency department. Complex decision making was performed, which includes reviewing the patient's available past medical records, laboratory results, and x-ray films. My assessment of this patient's clinical condition and my plan of care is as follows.     ASSESSMENT / PLAN    Principal Problem:    DKA (diabetic ketoacidoses) ([facility] [address] (6/10/2021)  Active Problems:    Type 2 diabetes mellitus, without long-term current use of insulin ([facility] [address] (6/10/2021)  Has been on oral tx for 20 yrs  No h/o DKA / admission  Ill past 48hrs with N&V, concern for pancreatitis  CT abd / CXR pending  Given 8u Hum R in ED  F/u BMP at 1400  NS 150cc/hr  Confer with pts ENDO - Dr. Makenzie Denson [phone]      Alcohol-induced acute pancreatitis (6/10/2021)  CLD  IV Fluids  Zofran prn  No Zetia      ETOH abuse (6/10/2021)  None x 48 hrs  No h/o DTs  Chronic tremor is familial and tx with Inderal  Xanax 0.5mg q8h      Hyperlipidemia (6/10/2021)  Tx Crestor / Theresa Desir  Hold on Zetia      Benign familial tremor (6/10/2021)  Cont Inderal 10mg bid        SAFETY:   Code Status:Full  DVT prophylaxis:Lovenox  Stress Ulcer prophylaxis: Pepcid  Bladder catheter:no  Family Contact Info:  Primary Emergency Contact: [address], Home Phone: [phone]  Bedded: [facility] Room ED01/01  Disposition: TBD, likely home when stable  Admission status:  Inpatient    -Tentative plan of care discussed with patient / family, who demonstrated understanding and is in agreement to the above  -Case was reviewed with the ED Provider, MD Justin Hong MD  [facility] Hospitalist  [phone]

## 2021-06-10 NOTE — ED NOTES
TRANSFER - OUT REPORT:    Verbal report given to Osmar Frost RN on [facility]  being transferred to room 120 for routine progression of care       Report consisted of patients Situation, Background, Assessment and   Recommendations(SBAR). Information from the following report(s) SBAR, Kardex, ED Summary, Intake/Output, MAR and Recent Results was reviewed with the receiving nurse. Lines:   Peripheral IV 06/10/21 Anterior; Left Forearm (Active)        Opportunity for questions and clarification was provided.       Patient transported with:   [employer]

## 2021-06-10 NOTE — ED TRIAGE NOTES
Pt arrived by POV with complaint of several days of N/V/D with SOB and high blood sugar (over 300). Pt reports he is not able to hold anything down including his medications. Pt reports he is a daily drinker 1/2 5th everyday and his last drink was 3 days ago, pt denies history of ETOH withdrawal.  Pt is awake alert and oriented X 4.   Pt educated on ER flow

## 2021-06-11 LAB
ANION GAP SERPL CALC-SCNC: 13 MMOL/L (ref 5–15)
ATRIAL RATE: 101 BPM
B-OH-BUTYR SERPL-SCNC: 2.64 MMOL/L
BASOPHILS # BLD: 0 K/UL (ref 0–0.1)
BASOPHILS NFR BLD: 0 % (ref 0–1)
BUN SERPL-MCNC: 18 MG/DL (ref 6–20)
BUN/CREAT SERPL: 21 (ref 12–20)
CALCIUM SERPL-MCNC: 8.5 MG/DL (ref 8.5–10.1)
CALCULATED P AXIS, ECG09: 53 DEGREES
CALCULATED R AXIS, ECG10: 32 DEGREES
CALCULATED T AXIS, ECG11: 71 DEGREES
CHLORIDE SERPL-SCNC: 96 MMOL/L (ref 97–108)
CO2 SERPL-SCNC: 23 MMOL/L (ref 21–32)
COMMENT, HOLDF: NORMAL
CREAT SERPL-MCNC: 0.87 MG/DL (ref 0.7–1.3)
DIAGNOSIS, 93000: NORMAL
DIFFERENTIAL METHOD BLD: ABNORMAL
EOSINOPHIL # BLD: 0 K/UL (ref 0–0.4)
EOSINOPHIL NFR BLD: 0 % (ref 0–7)
ERYTHROCYTE [DISTWIDTH] IN BLOOD BY AUTOMATED COUNT: 12.4 % (ref 11.5–14.5)
GLUCOSE BLD STRIP.AUTO-MCNC: 117 MG/DL (ref 65–117)
GLUCOSE BLD STRIP.AUTO-MCNC: 122 MG/DL (ref 65–117)
GLUCOSE BLD STRIP.AUTO-MCNC: 127 MG/DL (ref 65–117)
GLUCOSE BLD STRIP.AUTO-MCNC: 249 MG/DL (ref 65–117)
GLUCOSE SERPL-MCNC: 218 MG/DL (ref 65–100)
HCT VFR BLD AUTO: 29.2 % (ref 36.6–50.3)
HGB BLD-MCNC: 10.8 G/DL (ref 12.1–17)
IMM GRANULOCYTES # BLD AUTO: 0 K/UL (ref 0–0.04)
IMM GRANULOCYTES NFR BLD AUTO: 1 % (ref 0–0.5)
LACTATE SERPL-SCNC: 1.1 MMOL/L (ref 0.4–2)
LIPASE SERPL-CCNC: 985 U/L (ref 73–393)
LYMPHOCYTES # BLD: 0.7 K/UL (ref 0.8–3.5)
LYMPHOCYTES NFR BLD: 15 % (ref 12–49)
MAGNESIUM SERPL-MCNC: 1.4 MG/DL (ref 1.6–2.4)
MCH RBC QN AUTO: 40 PG (ref 26–34)
MCHC RBC AUTO-ENTMCNC: 37 G/DL (ref 30–36.5)
MCV RBC AUTO: 108.1 FL (ref 80–99)
MONOCYTES # BLD: 0.4 K/UL (ref 0–1)
MONOCYTES NFR BLD: 8 % (ref 5–13)
NEUTS SEG # BLD: 3.7 K/UL (ref 1.8–8)
NEUTS SEG NFR BLD: 77 % (ref 32–75)
NRBC # BLD: 0 K/UL (ref 0–0.01)
NRBC BLD-RTO: 0 PER 100 WBC
P-R INTERVAL, ECG05: 196 MS
PLATELET # BLD AUTO: 76 K/UL (ref 150–400)
PMV BLD AUTO: 10.2 FL (ref 8.9–12.9)
POTASSIUM SERPL-SCNC: 3.1 MMOL/L (ref 3.5–5.1)
Q-T INTERVAL, ECG07: 362 MS
QRS DURATION, ECG06: 94 MS
QTC CALCULATION (BEZET), ECG08: 469 MS
RBC # BLD AUTO: 2.7 M/UL (ref 4.1–5.7)
SAMPLES BEING HELD,HOLD: NORMAL
SERVICE CMNT-IMP: ABNORMAL
SERVICE CMNT-IMP: NORMAL
SODIUM SERPL-SCNC: 132 MMOL/L (ref 136–145)
VENTRICULAR RATE, ECG03: 101 BPM
WBC # BLD AUTO: 4.7 K/UL (ref 4.1–11.1)

## 2021-06-11 PROCEDURE — 74011250637 HC RX REV CODE- 250/637: Performed by: INTERNAL MEDICINE

## 2021-06-11 PROCEDURE — 80048 BASIC METABOLIC PNL TOTAL CA: CPT

## 2021-06-11 PROCEDURE — 74011250636 HC RX REV CODE- 250/636: Performed by: INTERNAL MEDICINE

## 2021-06-11 PROCEDURE — 82962 GLUCOSE BLOOD TEST: CPT

## 2021-06-11 PROCEDURE — 85025 COMPLETE CBC W/AUTO DIFF WBC: CPT

## 2021-06-11 PROCEDURE — 74011636637 HC RX REV CODE- 636/637: Performed by: INTERNAL MEDICINE

## 2021-06-11 PROCEDURE — 36415 COLL VENOUS BLD VENIPUNCTURE: CPT

## 2021-06-11 PROCEDURE — 83690 ASSAY OF LIPASE: CPT

## 2021-06-11 PROCEDURE — 65270000029 HC RM PRIVATE

## 2021-06-11 PROCEDURE — 83605 ASSAY OF LACTIC ACID: CPT

## 2021-06-11 PROCEDURE — 83735 ASSAY OF MAGNESIUM: CPT

## 2021-06-11 RX ORDER — POTASSIUM CHLORIDE 750 MG/1
10 TABLET, FILM COATED, EXTENDED RELEASE ORAL 2 TIMES DAILY
Status: DISCONTINUED | OUTPATIENT
Start: 2021-06-11 | End: 2021-06-14 | Stop reason: HOSPADM

## 2021-06-11 RX ORDER — POTASSIUM CHLORIDE AND SODIUM CHLORIDE 450; 150 MG/100ML; MG/100ML
INJECTION, SOLUTION INTRAVENOUS CONTINUOUS
Status: DISCONTINUED | OUTPATIENT
Start: 2021-06-11 | End: 2021-06-13

## 2021-06-11 RX ORDER — MAGNESIUM SULFATE HEPTAHYDRATE 40 MG/ML
2 INJECTION, SOLUTION INTRAVENOUS ONCE
Status: COMPLETED | OUTPATIENT
Start: 2021-06-11 | End: 2021-06-11

## 2021-06-11 RX ORDER — ESCITALOPRAM OXALATE 10 MG/1
10 TABLET ORAL EVERY EVENING
Status: DISCONTINUED | OUTPATIENT
Start: 2021-06-11 | End: 2021-06-14 | Stop reason: HOSPADM

## 2021-06-11 RX ADMIN — INSULIN LISPRO 3 UNITS: 100 INJECTION, SOLUTION INTRAVENOUS; SUBCUTANEOUS at 06:36

## 2021-06-11 RX ADMIN — ALPRAZOLAM 0.5 MG: 0.5 TABLET ORAL at 21:04

## 2021-06-11 RX ADMIN — MAGNESIUM SULFATE HEPTAHYDRATE 2 G: 40 INJECTION, SOLUTION INTRAVENOUS at 08:59

## 2021-06-11 RX ADMIN — PROPRANOLOL HYDROCHLORIDE 10 MG: 10 TABLET ORAL at 08:47

## 2021-06-11 RX ADMIN — FENOFIBRATE 145 MG: 145 TABLET ORAL at 08:47

## 2021-06-11 RX ADMIN — ALPRAZOLAM 0.5 MG: 0.5 TABLET ORAL at 06:21

## 2021-06-11 RX ADMIN — ESCITALOPRAM OXALATE 10 MG: 10 TABLET ORAL at 17:11

## 2021-06-11 RX ADMIN — FAMOTIDINE 20 MG: 20 TABLET ORAL at 17:11

## 2021-06-11 RX ADMIN — POTASSIUM CHLORIDE AND SODIUM CHLORIDE: 450; 150 INJECTION, SOLUTION INTRAVENOUS at 21:04

## 2021-06-11 RX ADMIN — POTASSIUM CHLORIDE AND SODIUM CHLORIDE: 450; 150 INJECTION, SOLUTION INTRAVENOUS at 08:59

## 2021-06-11 RX ADMIN — POTASSIUM CHLORIDE 10 MEQ: 750 TABLET, FILM COATED, EXTENDED RELEASE ORAL at 10:29

## 2021-06-11 RX ADMIN — PROPRANOLOL HYDROCHLORIDE 10 MG: 10 TABLET ORAL at 17:11

## 2021-06-11 RX ADMIN — ENOXAPARIN SODIUM 40 MG: 40 INJECTION SUBCUTANEOUS at 08:47

## 2021-06-11 RX ADMIN — ALPRAZOLAM 0.5 MG: 0.5 TABLET ORAL at 13:46

## 2021-06-11 RX ADMIN — POTASSIUM CHLORIDE 10 MEQ: 750 TABLET, FILM COATED, EXTENDED RELEASE ORAL at 17:11

## 2021-06-11 RX ADMIN — SODIUM CHLORIDE 150 ML/HR: 9 INJECTION, SOLUTION INTRAVENOUS at 03:45

## 2021-06-11 RX ADMIN — ATORVASTATIN CALCIUM 20 MG: 20 TABLET, FILM COATED ORAL at 21:04

## 2021-06-11 NOTE — PROGRESS NOTES
Problem: Falls - Risk of  Goal: *Absence of Falls  Description: Document Wendy Nevaehs Fall Risk and appropriate interventions in the flowsheet.   Outcome: Progressing Towards Goal  Note: Fall Risk Interventions:            Medication Interventions: Patient to call before getting OOB, Bed/chair exit alarm    Elimination Interventions: Call light in reach, Bed/chair exit alarm              Problem: General Medical Care Plan  Goal: *Vital signs within specified parameters  Outcome: Progressing Towards Goal  Goal: *Labs within defined limits  Outcome: Progressing Towards Goal  Goal: *Absence of infection signs and symptoms  Outcome: Progressing Towards Goal  Goal: *Optimize nutritional status  Outcome: Progressing Towards Goal  Goal: *Anxiety reduced or absent  Outcome: Progressing Towards Goal

## 2021-06-11 NOTE — PROGRESS NOTES
Comprehensive Nutrition Assessment    Type and Reason for Visit: Initial    Nutrition Recommendations/Plan: clear liquid dm diet-4 carb choices, offer supplements if po intake <50%, advance diet per MD recommendations     Nutrition Assessment:    Present on Admission:   DKA (diabetic ketoacidoses) ([address]   Type 2 diabetes mellitus, without long-term current use of insulin (HCC)   Hyperlipidemia   Benign familial tremor   ETOH abuse   Alcohol-induced acute pancreatitis    Pt has poor po intake as he was put back on a diet CL. He feels as if his appetite is coming back but he was NPO for pancreatitis. He has a continuous blood sugar monitor on and showed me what his BS was while I was in the room on his phone. His A1c% 6.4 is good and pt stated that is good for him. Blood sugar running  here. He takes his blood sugar reading 10x a day to record off the monitor in his arm on his phone. His Na is 132 low, potassium 3.3 low, H/H 10.8/29.2 low, lipase is down from 1689 to 985. Educated pt on low fat diet when he goes home r/t pancreatitis handout given and all questions answered. Estimated Daily Nutrient Needs:  Energy (kcal): 2300; Weight Used for Energy Requirements: Current  Protein (g): 112; Weight Used for Protein Requirements: Current  Fluid (ml/day): 2300; Method Used for Fluid Requirements: 1 ml/kcal      Nutrition Related Findings:       Patient Vitals for the past 168 hrs:   % Diet Eaten   06/11/21 1200 1 - 25%   06/11/21 0900 1 - 25%       Wounds:    None       Current Nutrition Therapies:  ADULT DIET Clear Liquid; 4 carb choices (60 gm/meal); Low Fat/Low Chol/High Fiber/BETY    Anthropometric Measures:  · Height:  6' 1\" (185.4 cm)  · Current Body Wt:  93 kg (205 lb)   · Admission Body Wt:  205 lb    · Usual Body Wt:  98.9 kg (218 lb)     · Ideal Body Wt:  184 lbs:  111.4 %   · Adjusted Body Weight:   ; Weight Adjustment for:     · Adjusted BMI:       · BMI Category: Overweight (BMI 25.0-29. 9) Weight Loss Metrics 6/10/2021 5/4/2018 5/1/2018   Today's Wt 205 lb 210 lb 226 lb   BMI 27.05 kg/m2 27.71 kg/m2 29.82 kg/m2       Nutrition Diagnosis:   · Inadequate oral intake related to acute injury/trauma as evidenced by intake 26-50%, intake 0-25%      Nutrition Interventions:   Food and/or Nutrient Delivery: Continue current diet  Nutrition Education and Counseling: Education not indicated, Education needed, Education initiated, Education completed  Coordination of Nutrition Care: Continue to monitor while inpatient, Interdisciplinary rounds    Goals:  po intake at least 50% of most meals x 5-7 days       Nutrition Monitoring and Evaluation:   Behavioral-Environmental Outcomes: None identified  Food/Nutrient Intake Outcomes: Diet advancement/tolerance, Food and nutrient intake  Physical Signs/Symptoms Outcomes: Biochemical data, Weight    Discharge Planning:     Too soon to determine     Electronically signed by Ioana Greer RD on 6/11/2021 at 5:59 PM

## 2021-06-11 NOTE — PROGRESS NOTES
Bedside and Verbal shift change report given to PRICILA Cabral RN (oncoming nurse) by ADELIA Herring (offgoing nurse). Report included the following information SBAR and Kardex. Duarte score 3  Bed/chair alarm yes in use. If in use it is set at the highest volume. Intravenous fluids were administered, normal saline 150 ml/hr  Patient Vitals for the past 12 hrs:   Temp Pulse Resp BP SpO2   06/10/21 2005 97.3 °F (36.3 °C) 82 20 (!) 171/88 100 %   06/10/21 1549 98.2 °F (36.8 °C) 99 18 (!) 178/86 96 %   06/10/21 1330    (!) 153/88 99 %   06/10/21 1056 98 °F (36.7 °C) 88 20 (!) 173/108 98 %     No flowsheet data found. All lab results for the last 24 hours reviewed.

## 2021-06-11 NOTE — PROGRESS NOTES
Spiritual Care Assessment/Progress Note  Chilango Bell      NAME: Valeria Navarro      MRN: [id number]  AGE: 72 y.o.  SEX: male  Taoist Affiliation: Christianity   Language: English     6/11/2021     Total Time (in minutes): 10     Spiritual Assessment begun in [facility] MED/SUR[facility] through conversation with:         [x]Patient        [] Family    [] Friend(s)        Reason for Consult: Initial/Spiritual assessment, patient floor     Spiritual beliefs: (Please include comment if needed)     [x] Identifies with a serafin tradition:         [] Supported by a serafin community:            [] Claims no spiritual orientation:           [] Seeking spiritual identity:                [] Adheres to an individual form of spirituality:           [] Not able to assess:                           Identified resources for coping:      [] Prayer                               [] Music                  [] Guided Imagery     [x] Family/friends                 [] Pet visits     [] Devotional reading                         [] Unknown     [] Other:                                         Interventions offered during this visit: (See comments for more details)    Patient Interventions: Affirmation of emotions/emotional suffering, Affirmation of serafin, Iconic (affirming the presence of God/Higher Power), Initial/Spiritual assessment, patient floor, Prayer (assurance of)           Plan of Care:     [x] Support spiritual and/or cultural needs    [] Support AMD and/or advance care planning process      [] Support grieving process   [] Coordinate Rites and/or Rituals    [] Coordination with community clergy   [] No spiritual needs identified at this time   [] Detailed Plan of Care below (See Comments)  [] Make referral to Music Therapy  [] Make referral to Pet Therapy     [] Make referral to Addiction services  [] Make referral to [facility]  [] Make referral to Spiritual Care Partner  [] No future visits requested        [] Follow up upon further referrals     Comments:Initial spiritual assessment in Rm 120. Patient/family shared about his medical issues. Provided empathic listening and spiritual support. Advised of  Availability.   [address] [place] [address]

## 2021-06-11 NOTE — PROGRESS NOTES
Care Management Interventions  PCP Verified by CM: No (Pt does NOT Have a PCP )  Palliative Care Criteria Met (RRAT>21 & CHF Dx)?: No (NO MD order)  Mode of Transport at Discharge: Other (see comment) (Wife POV )  Transition of Care Consult (CM Consult): Discharge Planning  Physical Therapy Consult: No  Occupational Therapy Consult: No  Speech Therapy Consult: No  Current Support Network: Lives with Spouse  Confirm Follow Up Transport: Family  The Plan for Transition of Care is Related to the Following Treatment Goals : Treat DKA   Discharge Location  Discharge Placement: Home    Patient lives at home with his wife. He lived in [place] before the pandemic but decided to move down to [facility] permanently when the pandemic hit about a year ago. Since then he has NOT had a primary care physician. He states he didn't know there were PCPs in the area. Instructed him that there were many in the area and we would work on making him an appointment before he leaves. Patient does not use any DME. He does not have a ACP but states he would like his wife to be his primary healthcare decision maker. Patient also states he does NOT have medicare. No IMM letter required. Care management information given to patient. Instructed him to call if he has any questions or concerns.        Reason for Admission:  DKA                      RUR Score:     11%                Plan for utilizing home health:      TBD     PCP: First and Last name:  Eris, Not On File, NP     Name of Practice:  DOES NOT HAVE PCP    Are you a current patient: Yes/No:    Approximate date of last visit:    Can you participate in a virtual visit with your PCP:                     Current Advanced Directive/Advance Care Plan: Full Code      Healthcare Decision Maker:   Click here to complete Fanaticall including selection of the Healthcare Decision Maker Relationship (ie \"Primary\")             Secondary Decision Maker: Oliva Fisher - Spouse - [phone]                  Transition of Care Plan:                  Home when medically stable.

## 2021-06-11 NOTE — PROGRESS NOTES
Pt remains oriented to self and situation, though now he cannot recall the name of the hospital.Calm, pleasant, and interactive with staff. Pt also did not remember this morning that I had been his primary nurse all night. Fall precautions remain in place. HS blood last night was 81. Pt remains on CL diet, so pt was given as Ensure clear. BS this am =249. Pt denies any nausea or discomfort. The above information was relayed to [facility] [place] who will inform MD in morning meeting.

## 2021-06-11 NOTE — PROGRESS NOTES
Bedside shift change report given to PRICILA Saldana RN (oncoming nurse) by Rosa Maria Cabral RN (offgoing nurse). Report included the following information Kardex.

## 2021-06-11 NOTE — PROGRESS NOTES
IDR Team; MD, Nursing, Care Manager,Pharmacy Physical therapy, Occupational therapy,  and Dietician, met to review patient's plan of care. Discussed goals, interventions, barriers and progress. RUR: 11%  LOW    Team will continue to monitor progress and report any concerns to the physician and care management as indicated. Transition of Care Plan:   No nausea and vomiting today so far. Patient also has DKA, monitoring blood sugars. Mg level low, receiving replacements. Dr. Cole Ramos will be calling the patient's endocrinologist. Patient recently moved to the area from [place] and has a PCP in [place].

## 2021-06-11 NOTE — PROGRESS NOTES
Bedside and Verbal shift change report given to JAVIER Petersen RN (oncoming nurse) by Srikanth Sadlana RN (offgoing nurse). Report included the following information SBAR and Kardex.

## 2021-06-11 NOTE — PROGRESS NOTES
[facility]  Hospitalist Progress Note    NAME: Marcus Johnson   :  1955   MRN:  [id number]     Total duration of encounter: 1 day      Interim Hospital Summary: 72 y.o. male who presented on 6/10/2021 with DKA (diabetic ketoacidoses) ([facility] [address] He has a past medical history of Diabetes ([facility] [address] Hypertension, and Ill-defined condition (20 years ago). Pt presenting h/o N&V and poor DM control worsening over 48hrs. Pt admitted to excessive EtOH abuse. Has been on oral tx with HgA1c 6.4 on this admission, followed by ENDO in [place] Dr. Makenzie Denson. Pt also admits to diarrhea with concern about eating some bad [employer] (just opened by his wife). ED w/u noted for AG Met Acidosis, Mild Lactate elevation, elevated Lipase with CT c/w mild pancreatitis. WBC 4.7 and No fever. Subjective:     Chief Complaint / Reason for Physician Visit  \"better\".   Discussed with RN   Improved following IVF in ED  No similar history  No prior dx Pancreatitis  No new recent medications   Last year was followed by TeleMed    Review of Systems:  Symptom Y/N Comments  Symptom Y/N Comments   Fever/Chills n   Chest Pain n    Poor Appetite n   Edema n    Cough n   Abdominal Pain y  better   Sputum n   Joint Pain n    SOB/HSIEH n   Pruritis/Rash n    Nausea/vomit n  cleared  Tolerating PT/OT     Diarrhea y   Tolerating Diet y   CLD on adm   Constipation n   Other         Current Facility-Administered Medications:     0.45% sodium chloride with KCl 20 mEq/L infusion, , IntraVENous, CONTINUOUS, Jessica Burnett MD, Last Rate: 100 mL/hr at 21 0859, New Bag at 21 0859    potassium chloride SR (KLOR-CON 10) tablet 10 mEq, 10 mEq, Oral, BID, Jessica Burnett MD, 10 mEq at 21 1029    fenofibrate nanocrystallized (TRICOR) tablet 145 mg, 145 mg, Oral, DAILY, Jessica Burnett MD, 145 mg at 21 0847    propranoloL (INDERAL) tablet 10 mg, 10 mg, Oral, BID, Jessica Burnett MD, 10 mg at 21 0847    atorvastatin (LIPITOR) tablet 20 mg, 20 mg, Oral, QHS, Geovanna Gaines MD, 20 mg at 06/10/21 2156    sodium chloride (NS) flush 5-40 mL, 5-40 mL, IntraVENous, Q8H, Goevanna Gaines MD    sodium chloride (NS) flush 5-40 mL, 5-40 mL, IntraVENous, PRN, Geovanna Gaines MD    acetaminophen (TYLENOL) tablet 650 mg, 650 mg, Oral, Q6H PRN **OR** acetaminophen (TYLENOL) suppository 650 mg, 650 mg, Rectal, Q6H PRN, Geovanna Gaines MD    polyethylene glycol (MIRALAX) packet 17 g, 17 g, Oral, DAILY PRN, Geovanna Gaines MD    enoxaparin (LOVENOX) injection 40 mg, 40 mg, SubCUTAneous, DAILY, Geovanna Gaines MD, 40 mg at 06/11/21 0847    glucose chewable tablet 16 g, 4 Tablet, Oral, PRN, Geovanna Gaines MD    dextrose (D50W) injection syrg 12.5-25 g, 25-50 mL, IntraVENous, PRN, Geovanna Gaines MD    glucagon [facility]) injection 1 mg, 1 mg, IntraMUSCular, PRN, Geovanna Gaines MD    ondansetron [facility]) injection 4 mg, 4 mg, IntraVENous, Q6H PRN, Geovanna Gaines MD, 4 mg at 06/10/21 2010    famotidine (PEPCID) tablet 20 mg, 20 mg, Oral, QPM, Geovanna Gaines MD, 20 mg at 06/10/21 1723    insulin lispro (HUMALOG) injection, , SubCUTAneous, AC&HS, Geovanna Gaines MD, 3 Units at 06/11/21 0636    ALPRAZolam [facility]) tablet 0.5 mg, 0.5 mg, Oral, Q8H, Geovanna Gaines MD, 0.5 mg at 06/11/21 2276    Objective:     VITALS:   Patient Vitals for the past 12 hrs:   Temp Pulse Resp BP SpO2   06/11/21 0753 97.6 °F (36.4 °C) 85 18 (!) 161/79 97 %   06/10/21 2322 97.2 °F (36.2 °C) 78 18 (!) 168/82 97 %       Intake/Output Summary (Last 24 hours) at 6/11/2021 1037  Last data filed at 6/11/2021 0736  Gross per 24 hour   Intake 4822 ml   Output 1200 ml   Net 3622 ml        PHYSICAL EXAM:  General: WD, WN. Alert, cooperative, no acute distress    EENT:  EOMI. Anicteric sclerae. MMM  Resp:  CTA bilaterally, no wheezing or rales. No accessory muscle use  CV:  Regular  rhythm,  No edema  GI:  Soft, Non distended, Non tender.   +Bowel sounds  Neurologic:  Alert and oriented X 3, normal speech, Essential B hand tremor  Psych:   Good insight. Not anxious or agitated  Skin:  No rashes. No jaundice    LABS:  I reviewed today's most current labs and imaging studies. Pertinent labs include:  Recent Labs     06/11/21  0530 06/10/21  1108   WBC 4.7 7.4   HGB 10.8* 14.3   HCT 29.2* 38.3   PLT 76* 156     Recent Labs     06/11/21  0530 06/10/21  1614 06/10/21  1108   * 133* 129*   K 3.1* 3.9 4.1   CL 96* 95* 90*   CO2 23 19* 18*   * 183* 344*   BUN 18 21* 21*   CREA 0.87 1.08 1.24   CA 8.5 8.8 9.7   MG 1.4*  --   --    ALB  --   --  3.9   TBILI  --   --  1.9*   ALT  --   --  83*     Results for Kervin Sierra (MRN [id number]) as of 6/11/2021 10:39   6/10/2021 11:08 6/10/2021 16:14 6/11/2021 05:30   Lipase 1,689 (H)  985 (H)   Lactic acid  2.3 (HH) 1.1   Troponin-I, Qt. <0.05     Hg A1c, (calc) 6.4 (H)       RADIOLOGY:  CT ABD/PELVIS:  1. Mild, uncomplicated, acute pancreatitis. 2. Moderate hepatic steatosis. Hepatomegaly. PA/LAT CXR:  The lungs are clear. The central airways are patent. The heart size is normal.  No pneumothorax or pleural effusion.   IMPRESSION:  Clear lungs. EKG 6/10:  , poor data quality, baseline wanders      Procedures: see electronic medical records for all procedures/Xrays and details which were not copied into this note but were reviewed prior to creation of Plan.         Assessment / Plan:  Principal Problem:    DKA (diabetic ketoacidoses) ([facility] [address] (6/10/2021)  Active Problems:    Type 2 diabetes mellitus, without long-term current use of insulin ([facility] [address] (6/10/2021)  Has been on oral tx for 20 yrs  No h/o DKA / admission  Ill past 48hrs with N&V, concern for pancreatitis  CT abd c/w mild pancreatitis  Given 8u Hum R in ED  F/u BMP / Mg / Amylase in AM  Change IV 1/2NS with KCl at 100 cc/hr, with KDur 10meq bid  magnesium sulfate IV x 1  Confer with pts ENDO - Dr. Umberto Omalley [phone]       Alcohol-induced acute pancreatitis (6/10/2021)  CLD  IV Fluids  Zofran prn  No Zetia       ETOH abuse (6/10/2021)  None x 48 hrs  No h/o DTs ?? Chronic tremor is familial and tx with Inderal  Xanax 0.5mg q8h       Hyperlipidemia (6/10/2021)  Tx Crestor / Mabel Laser  Hold on Zetia       Benign familial tremor (6/10/2021)  Cont Inderal 10mg bid    SAFETY:   Code Status:Full  DVT prophylaxis:Lovenox  Stress Ulcer prophylaxis: Pepcid  Bladder catheter:no  Family Contact Info:  Primary Emergency Contact: [address], Home Phone: [phone]  Bedded: [facility] Room ED01/01  Disposition: TBD, likely home when stable  Admission status:  Inpatient    Reviewed most current lab test results and cultures  YES  Reviewed most current radiology test results   YES  Review and summation of old records today    NO  Reviewed patient's current orders and MAR    YES  PMH/SH reviewed - no change compared to H&P    Care Plan discussed with:                                   Comments  Patient x     Family  x wife   RN x     Care Manager  x     Consultant  x  Called Dr. Raman Ocampo, [address] [place] [address]  [phone]                       Multidiciplinary team rounds were held today with , nursing, pharmacist and clinical coordinator. Patient's plan of care was discussed; medications were reviewed and discharge planning was addressed.         ____________________________________________    Total NON Critical Care TIME:  40   Minutes        Comments   >50% of visit spent in counseling and coordination of care   x      Signed: Sariah Monaco MD  [facility] Hospitalist  [phone]

## 2021-06-12 PROBLEM — F32.A DEPRESSION: Chronic | Status: ACTIVE | Noted: 2021-06-12

## 2021-06-12 PROBLEM — E87.29 ALCOHOLIC KETOACIDOSIS: Status: ACTIVE | Noted: 2021-06-12

## 2021-06-12 LAB
ANION GAP SERPL CALC-SCNC: 14 MMOL/L (ref 5–15)
BUN SERPL-MCNC: 15 MG/DL (ref 6–20)
BUN/CREAT SERPL: 19 (ref 12–20)
CALCIUM SERPL-MCNC: 8.9 MG/DL (ref 8.5–10.1)
CHLORIDE SERPL-SCNC: 94 MMOL/L (ref 97–108)
CO2 SERPL-SCNC: 24 MMOL/L (ref 21–32)
CREAT SERPL-MCNC: 0.8 MG/DL (ref 0.7–1.3)
GLUCOSE BLD STRIP.AUTO-MCNC: 128 MG/DL (ref 65–117)
GLUCOSE BLD STRIP.AUTO-MCNC: 130 MG/DL (ref 65–117)
GLUCOSE BLD STRIP.AUTO-MCNC: 175 MG/DL (ref 65–117)
GLUCOSE BLD STRIP.AUTO-MCNC: 252 MG/DL (ref 65–117)
GLUCOSE SERPL-MCNC: 115 MG/DL (ref 65–100)
LIPASE SERPL-CCNC: 546 U/L (ref 73–393)
PHOSPHATE SERPL-MCNC: 3.1 MG/DL (ref 2.6–4.7)
POTASSIUM SERPL-SCNC: 3.5 MMOL/L (ref 3.5–5.1)
SERVICE CMNT-IMP: ABNORMAL
SODIUM SERPL-SCNC: 132 MMOL/L (ref 136–145)

## 2021-06-12 PROCEDURE — 84100 ASSAY OF PHOSPHORUS: CPT

## 2021-06-12 PROCEDURE — 74011250636 HC RX REV CODE- 250/636: Performed by: INTERNAL MEDICINE

## 2021-06-12 PROCEDURE — 83690 ASSAY OF LIPASE: CPT

## 2021-06-12 PROCEDURE — 82962 GLUCOSE BLOOD TEST: CPT

## 2021-06-12 PROCEDURE — 80048 BASIC METABOLIC PNL TOTAL CA: CPT

## 2021-06-12 PROCEDURE — 36415 COLL VENOUS BLD VENIPUNCTURE: CPT

## 2021-06-12 PROCEDURE — 74011250637 HC RX REV CODE- 250/637: Performed by: INTERNAL MEDICINE

## 2021-06-12 PROCEDURE — 74011636637 HC RX REV CODE- 636/637: Performed by: INTERNAL MEDICINE

## 2021-06-12 PROCEDURE — 65270000029 HC RM PRIVATE

## 2021-06-12 RX ORDER — ALPRAZOLAM 0.5 MG/1
1 TABLET ORAL
Status: DISCONTINUED | OUTPATIENT
Start: 2021-06-12 | End: 2021-06-14 | Stop reason: HOSPADM

## 2021-06-12 RX ORDER — ASPIRIN 325 MG/1
100 TABLET, FILM COATED ORAL DAILY
Status: DISCONTINUED | OUTPATIENT
Start: 2021-06-12 | End: 2021-06-14 | Stop reason: HOSPADM

## 2021-06-12 RX ORDER — TRAZODONE HYDROCHLORIDE 50 MG/1
50 TABLET ORAL
Status: DISCONTINUED | OUTPATIENT
Start: 2021-06-12 | End: 2021-06-14 | Stop reason: HOSPADM

## 2021-06-12 RX ORDER — GLIPIZIDE 5 MG/1
5 TABLET ORAL
Status: DISCONTINUED | OUTPATIENT
Start: 2021-06-12 | End: 2021-06-14 | Stop reason: HOSPADM

## 2021-06-12 RX ORDER — METFORMIN HYDROCHLORIDE 500 MG/1
500 TABLET, EXTENDED RELEASE ORAL
Status: DISCONTINUED | OUTPATIENT
Start: 2021-06-12 | End: 2021-06-14 | Stop reason: HOSPADM

## 2021-06-12 RX ADMIN — ALPRAZOLAM 0.5 MG: 0.5 TABLET ORAL at 05:06

## 2021-06-12 RX ADMIN — TRAZODONE HYDROCHLORIDE 50 MG: 50 TABLET ORAL at 22:11

## 2021-06-12 RX ADMIN — ALPRAZOLAM 0.5 MG: 0.5 TABLET ORAL at 14:12

## 2021-06-12 RX ADMIN — ESCITALOPRAM OXALATE 10 MG: 10 TABLET ORAL at 17:00

## 2021-06-12 RX ADMIN — GLIPIZIDE 5 MG: 5 TABLET ORAL at 14:12

## 2021-06-12 RX ADMIN — ALPRAZOLAM 1 MG: 0.5 TABLET ORAL at 17:10

## 2021-06-12 RX ADMIN — FENOFIBRATE 145 MG: 145 TABLET ORAL at 08:51

## 2021-06-12 RX ADMIN — ENOXAPARIN SODIUM 40 MG: 40 INJECTION SUBCUTANEOUS at 08:52

## 2021-06-12 RX ADMIN — POTASSIUM CHLORIDE 10 MEQ: 750 TABLET, FILM COATED, EXTENDED RELEASE ORAL at 08:52

## 2021-06-12 RX ADMIN — ATORVASTATIN CALCIUM 20 MG: 20 TABLET, FILM COATED ORAL at 21:59

## 2021-06-12 RX ADMIN — FAMOTIDINE 20 MG: 20 TABLET ORAL at 17:00

## 2021-06-12 RX ADMIN — POTASSIUM CHLORIDE AND SODIUM CHLORIDE: 450; 150 INJECTION, SOLUTION INTRAVENOUS at 17:09

## 2021-06-12 RX ADMIN — THIAMINE HCL TAB 100 MG 100 MG: 100 TAB at 08:52

## 2021-06-12 RX ADMIN — POTASSIUM CHLORIDE AND SODIUM CHLORIDE: 450; 150 INJECTION, SOLUTION INTRAVENOUS at 06:48

## 2021-06-12 RX ADMIN — INSULIN LISPRO 5 UNITS: 100 INJECTION, SOLUTION INTRAVENOUS; SUBCUTANEOUS at 12:21

## 2021-06-12 RX ADMIN — PROPRANOLOL HYDROCHLORIDE 10 MG: 10 TABLET ORAL at 08:52

## 2021-06-12 RX ADMIN — POTASSIUM CHLORIDE 10 MEQ: 750 TABLET, FILM COATED, EXTENDED RELEASE ORAL at 17:00

## 2021-06-12 RX ADMIN — METFORMIN HYDROCHLORIDE 500 MG: 500 TABLET, EXTENDED RELEASE ORAL at 17:00

## 2021-06-12 RX ADMIN — Medication 10 ML: at 14:13

## 2021-06-12 RX ADMIN — PROPRANOLOL HYDROCHLORIDE 10 MG: 10 TABLET ORAL at 17:00

## 2021-06-12 NOTE — PROGRESS NOTES
Bedside shift change report given to Rob (oncoming nurse) by [facility]  (offgoing nurse). Report included the following information SBAR, Kardex, Intake/Output and MAR.

## 2021-06-12 NOTE — PROGRESS NOTES
Problem: Falls - Risk of  Goal: *Absence of Falls  Description: Document [place] Fall Risk and appropriate interventions in the flowsheet.   Outcome: Progressing Towards Goal  Note: Fall Risk Interventions:            Medication Interventions: Bed/chair exit alarm, Patient to call before getting OOB    Elimination Interventions: Bed/chair exit alarm

## 2021-06-12 NOTE — PROGRESS NOTES
[facility]  Hospitalist Progress Note    NAME: Jaspreet Rene   :  1955   MRN:  [id number]     Total duration of encounter: 2 days      Interim Hospital Summary: 72 y.o. male who presented on 6/10/2021 with DKA (diabetic ketoacidoses) ([facility] [address] He has a past medical history of Diabetes ([facility] [address] Hypertension, and Ill-defined condition (20 years ago). Pt presenting h/o N&V and poor DM control worsening over 48hrs. Pt admitted to excessive EtOH abuse. Has been on oral tx with HgA1c 6.4 on this admission, followed by [facility] in [place] Dr. Easton Almeida. Pt also having c/o diarrhea - reporting 3 loose stools daily, requiring a diaper due to poor control. ED w/u noted for AG Met Acidosis, Mild Lactate elevation, elevated Lipase with CT c/w mild pancreatitis. WBC 4.7 and No fever. Beta-Hydroxbuterate resulted elevated 2.64 (nl <7.4) c/w alcoholic ketoacidosis. Subjective:     Chief Complaint / Reason for Physician Visit  \"eating breakfast this AM with tolerance\".   Discussed with RN   Symptoms improved following IVF in ED  No similar history  No prior dx Pancreatitis  No new recent medications   Last year was followed by TeleMed with [facility]    Review of Systems:  Symptom Y/N Comments  Symptom Y/N Comments   Fever/Chills n   Chest Pain n    Poor Appetite n   Edema n    Cough n   Abdominal Pain y  better   Sputum n   Joint Pain n    SOB/HSIEH n   Pruritis/Rash n    Nausea/vomit n  cleared  Tolerating PT/OT     Diarrhea y  3/day loose  Tolerating Diet y   CLD --> Diabetic   Constipation n   Other         Current Facility-Administered Medications:     thiamine mononitrate (B-1) tablet 100 mg, 100 mg, Oral, DAILY, Rivka Christopher MD, 100 mg at 21 0852    0.45% sodium chloride with KCl 20 mEq/L infusion, , IntraVENous, CONTINUOUS, Rivka Christopher MD, Last Rate: 100 mL/hr at 21 0648, New Bag at 21 0648    potassium chloride SR (KLOR-CON 10) tablet 10 mEq, 10 mEq, Oral, BID, Delfina Cunningham MD, 10 mEq at 06/12/21 8948    escitalopram oxalate (LEXAPRO) tablet 10 mg, 10 mg, Oral, QPM, Delfina Cunningham MD, 10 mg at 06/11/21 1711    fenofibrate nanocrystallized (TRICOR) tablet 145 mg, 145 mg, Oral, DAILY, Delfina Cunningham MD, 145 mg at 06/12/21 0851    propranoloL (INDERAL) tablet 10 mg, 10 mg, Oral, BID, Delfina Cunningham MD, 10 mg at 06/12/21 0843    atorvastatin (LIPITOR) tablet 20 mg, 20 mg, Oral, QHS, Delfina Cunningham MD, 20 mg at 06/11/21 2104    sodium chloride (NS) flush 5-40 mL, 5-40 mL, IntraVENous, Q8H, Delfina Cunningham MD    sodium chloride (NS) flush 5-40 mL, 5-40 mL, IntraVENous, PRN, Delfina Cunningham MD    acetaminophen (TYLENOL) tablet 650 mg, 650 mg, Oral, Q6H PRN **OR** acetaminophen (TYLENOL) suppository 650 mg, 650 mg, Rectal, Q6H PRN, Delfina Cunningham MD    polyethylene glycol (MIRALAX) packet 17 g, 17 g, Oral, DAILY PRN, Delfina Cunningham MD    enoxaparin (LOVENOX) injection 40 mg, 40 mg, SubCUTAneous, DAILY, Delfina Cunningham MD, 40 mg at 06/12/21 5016    glucose chewable tablet 16 g, 4 Tablet, Oral, PRN, Delfina Cunningham MD    dextrose (D50W) injection syrg 12.5-25 g, 25-50 mL, IntraVENous, PRN, Delfina Cunningham MD    glucagon [facility]) injection 1 mg, 1 mg, IntraMUSCular, PRN, Delfina Cunningham MD    ondansetron [facility]) injection 4 mg, 4 mg, IntraVENous, Q6H PRN, Delfina Cunningham MD, 4 mg at 06/10/21 2010    famotidine (PEPCID) tablet 20 mg, 20 mg, Oral, QPM, Delfina Cunningham MD, 20 mg at 06/11/21 1711    insulin lispro (HUMALOG) injection, , SubCUTAneous, AC&HS, Delfina Cunningham MD, 3 Units at 06/11/21 0636    ALPRAZolam Natalee Gray) tablet 0.5 mg, 0.5 mg, Oral, Q8H, Delfina Cunningham MD, 0.5 mg at 06/12/21 0506    Objective:     VITALS:   Patient Vitals for the past 12 hrs:   Temp Pulse Resp BP SpO2   06/12/21 0753 97.8 °F (36.6 °C) 88 20 (!) 146/79 99 %       Intake/Output Summary (Last 24 hours) at 6/12/2021 1131  Last data filed at 6/12/2021 1057  Gross per 24 hour   Intake 3268 ml   Output 3050 ml   Net -853 ml        PHYSICAL EXAM:  General: WD, WN. Alert, cooperative, no acute distress    EENT:  EOMI. Anicteric sclerae. MMM  Resp:  CTA bilaterally, no wheezing or rales. No accessory muscle use  CV:  Regular  rhythm,  No edema  GI:  Soft, Non distended, Non tender. +Bowel sounds  Neurologic:  Alert and oriented X 3, normal speech, Mild essential B hand tremor  Psych:    Not anxious or agitated  Skin:  No rashes. No jaundice    LABS:  I reviewed today's most current labs and imaging studies. Pertinent labs include:  Recent Labs     06/11/21  0530 06/10/21  1108   WBC 4.7 7.4   HGB 10.8* 14.3   HCT 29.2* 38.3   PLT 76* 156     Recent Labs     06/12/21  0655 06/11/21  0530 06/10/21  1614 06/10/21  1108   * 132* 133* 129*   K 3.5 3.1* 3.9 4.1   CL 94* 96* 95* 90*   CO2 24 23 19* 18*   * 218* 183* 344*   BUN 15 18 21* 21*   CREA 0.80 0.87 1.08 1.24   CA 8.9 8.5 8.8 9.7   MG  --  1.4*  --   --    PHOS 3.1  --   --   --    ALB  --   --   --  3.9   TBILI  --   --   --  1.9*   ALT  --   --   --  80*     Results for Taya Goldman (MRN [id number]) as of 6/12/2021 11:33   6/10/2021 11:08 6/10/2021 16:14 6/11/2021 05:30 6/12/2021 06:55   Lipase 1,689 (H)  985 (H) 546 (H)   Lactic acid  2.3 (HH) 1.1    Troponin-I, Qt. <0.05      Hg A1c, (calc) 6.4 (H)        Results for Taya Goldman (MRN [id number]) as of 6/12/2021 11:33   6/10/2021 16:14   B-hydroxybutyrate 2.64 (H)     RADIOLOGY:  CT ABD/PELVIS:  1. Mild, uncomplicated, acute pancreatitis. 2. Moderate hepatic steatosis. Hepatomegaly. PA/LAT CXR:  The lungs are clear. The central airways are patent. The heart size is normal.  No pneumothorax or pleural effusion.   IMPRESSION:  Clear lungs. EKG 6/10:  , poor data quality, baseline wanders      Procedures: see electronic medical records for all procedures/Xrays and details which were not copied into this note but were reviewed prior to creation of Plan.         Assessment / Plan:  Principal Problem:    Alcoholic ketoacidosis  More likely the cause for presenting w/o N&V a/w chronic EtOH abuse  Note elevated Beta Hydroxybuteric Acid c/w dx  Corrected with IVF  STOP ETOH - patient states he plans to  Stool for wbc / enteric bacterial panel pending    Active Problems:    DKA (diabetic ketoacidoses) ([address] (6/10/2021)    Type 2 diabetes mellitus, without long-term current use of insulin ([address] (6/10/2021)  Has been on oral tx for 20 yrs  No h/o DKA / admissions. A1c 6.4  Acidosis more likely from Alcoholic ketoacidosis   Ill past 48hrs with N&V, concern for pancreatitis  CT abd c/w mild pancreatitis  Given 8u Hum R in ED  IV 1/2NS with KCl, magnesium sulfate IV x 1  Confer with pts ENDO - Dr. Enmanuel Dubon [phone]       Alcohol-induced acute pancreatitis (6/10/2021)  CLD x 48hr. IV Fluids, Zofran prn, No Zetia  Improved with Lipase down to 500 range 6/12  Advance diet - Diabetic regular diet       ETOH abuse (6/10/2021)  None x 48 hrs on admission  No h/o DTs ??   Chronic tremor is familial and tx with Inderal  Xanax 0.5mg q8h  No delirium appreciated       Hyperlipidemia (6/10/2021)  Tx Crestor / Cyndi Boykin on Zetia which can be a/w pancreatitis       Benign familial tremor (6/10/2021)  Cont Inderal 10mg bid    SAFETY:   Code Status:Full  DVT prophylaxis:Lovenox  Stress Ulcer prophylaxis: Pepcid  Bladder catheter:no  Family Contact Info:  Primary Emergency Contact: Oliva Fisher, Home Phone: [phone]  Bedded: [facility] Room ED01/01  Disposition: TBD, likely home when stable  Admission status:  Inpatient    Reviewed most current lab test results and cultures  YES  Reviewed most current radiology test results   YES  Review and summation of old records today    NO  Reviewed patient's current orders and MAR    YES  PMH/SH reviewed - no change compared to H&P    Care Plan discussed with:                                   Comments  Patient x     Family  x wife   RN x     Care Manager  x Consultant  x  Called Dr. Julissa Leblanc, [facility]  [phone]                       Multidiciplinary team rounds were held today with , nursing, pharmacist and clinical coordinator. Patient's plan of care was discussed; medications were reviewed and discharge planning was addressed.         ____________________________________________    Total NON Critical Care TIME:  40   Minutes        Comments   >50% of visit spent in counseling and coordination of care   x      Signed: Justin Reyes MD  [facility] Hospitalist  [phone]

## 2021-06-13 LAB
ALBUMIN SERPL-MCNC: 3.2 G/DL (ref 3.5–5)
ALBUMIN/GLOB SERPL: 0.9 {RATIO} (ref 1.1–2.2)
ALP SERPL-CCNC: 96 U/L (ref 45–117)
ALT SERPL-CCNC: 70 U/L (ref 12–78)
ANION GAP SERPL CALC-SCNC: 11 MMOL/L (ref 5–15)
AST SERPL-CCNC: 89 U/L (ref 15–37)
BASOPHILS # BLD: 0 K/UL (ref 0–0.1)
BASOPHILS NFR BLD: 0 % (ref 0–1)
BILIRUB SERPL-MCNC: 1.2 MG/DL (ref 0.2–1)
BUN SERPL-MCNC: 15 MG/DL (ref 6–20)
BUN/CREAT SERPL: 18 (ref 12–20)
CALCIUM SERPL-MCNC: 9.2 MG/DL (ref 8.5–10.1)
CHLORIDE SERPL-SCNC: 96 MMOL/L (ref 97–108)
CO2 SERPL-SCNC: 25 MMOL/L (ref 21–32)
CREAT SERPL-MCNC: 0.83 MG/DL (ref 0.7–1.3)
DIFFERENTIAL METHOD BLD: ABNORMAL
EOSINOPHIL # BLD: 0 K/UL (ref 0–0.4)
EOSINOPHIL NFR BLD: 0 % (ref 0–7)
ERYTHROCYTE [DISTWIDTH] IN BLOOD BY AUTOMATED COUNT: 12 % (ref 11.5–14.5)
ERYTHROCYTE [DISTWIDTH] IN BLOOD BY AUTOMATED COUNT: NORMAL % (ref 11.5–14.5)
GLOBULIN SER CALC-MCNC: 3.7 G/DL (ref 2–4)
GLUCOSE BLD STRIP.AUTO-MCNC: 134 MG/DL (ref 65–117)
GLUCOSE BLD STRIP.AUTO-MCNC: 179 MG/DL (ref 65–117)
GLUCOSE BLD STRIP.AUTO-MCNC: 290 MG/DL (ref 65–117)
GLUCOSE BLD STRIP.AUTO-MCNC: 290 MG/DL (ref 65–117)
GLUCOSE SERPL-MCNC: 164 MG/DL (ref 65–100)
HCT VFR BLD AUTO: 29 % (ref 36.6–50.3)
HCT VFR BLD AUTO: NORMAL % (ref 36.6–50.3)
HGB BLD-MCNC: 10.5 G/DL (ref 12.1–17)
HGB BLD-MCNC: NORMAL G/DL (ref 12.1–17)
IMM GRANULOCYTES # BLD AUTO: 0 K/UL (ref 0–0.04)
IMM GRANULOCYTES NFR BLD AUTO: 0 % (ref 0–0.5)
LIPASE SERPL-CCNC: 770 U/L (ref 73–393)
LYMPHOCYTES # BLD: 0.6 K/UL (ref 0.8–3.5)
LYMPHOCYTES NFR BLD: 14 % (ref 12–49)
MAGNESIUM SERPL-MCNC: 1.4 MG/DL (ref 1.6–2.4)
MCH RBC QN AUTO: 39.3 PG (ref 26–34)
MCH RBC QN AUTO: NORMAL PG (ref 26–34)
MCHC RBC AUTO-ENTMCNC: 36.2 G/DL (ref 30–36.5)
MCHC RBC AUTO-ENTMCNC: NORMAL G/DL (ref 30–36.5)
MCV RBC AUTO: 108.6 FL (ref 80–99)
MCV RBC AUTO: NORMAL FL (ref 80–99)
MONOCYTES # BLD: 0 K/UL (ref 0–1)
MONOCYTES NFR BLD: 0 % (ref 5–13)
NEUTS BAND NFR BLD MANUAL: 8 %
NEUTS SEG # BLD: 3.7 K/UL (ref 1.8–8)
NEUTS SEG NFR BLD: 78 % (ref 32–75)
PLATELET # BLD AUTO: 106 K/UL (ref 150–400)
PLATELET # BLD AUTO: NORMAL K/UL (ref 150–400)
PMV BLD AUTO: 9.2 FL (ref 8.9–12.9)
PMV BLD AUTO: NORMAL FL (ref 8.9–12.9)
POTASSIUM SERPL-SCNC: 3.6 MMOL/L (ref 3.5–5.1)
PROT SERPL-MCNC: 6.9 G/DL (ref 6.4–8.2)
RBC # BLD AUTO: 2.67 M/UL (ref 4.1–5.7)
RBC # BLD AUTO: NORMAL M/UL (ref 4.1–5.7)
RBC MORPH BLD: ABNORMAL
SERVICE CMNT-IMP: ABNORMAL
SODIUM SERPL-SCNC: 132 MMOL/L (ref 136–145)
WBC # BLD AUTO: 4.3 K/UL (ref 4.1–11.1)
WBC # BLD AUTO: NORMAL K/UL (ref 4.1–11.1)

## 2021-06-13 PROCEDURE — 82962 GLUCOSE BLOOD TEST: CPT

## 2021-06-13 PROCEDURE — 85025 COMPLETE CBC W/AUTO DIFF WBC: CPT

## 2021-06-13 PROCEDURE — 83735 ASSAY OF MAGNESIUM: CPT

## 2021-06-13 PROCEDURE — 65270000029 HC RM PRIVATE

## 2021-06-13 PROCEDURE — 80053 COMPREHEN METABOLIC PANEL: CPT

## 2021-06-13 PROCEDURE — 74011250637 HC RX REV CODE- 250/637: Performed by: INTERNAL MEDICINE

## 2021-06-13 PROCEDURE — 36415 COLL VENOUS BLD VENIPUNCTURE: CPT

## 2021-06-13 PROCEDURE — 83690 ASSAY OF LIPASE: CPT

## 2021-06-13 PROCEDURE — 74011636637 HC RX REV CODE- 636/637: Performed by: INTERNAL MEDICINE

## 2021-06-13 PROCEDURE — 74011250636 HC RX REV CODE- 250/636: Performed by: INTERNAL MEDICINE

## 2021-06-13 RX ORDER — MAGNESIUM SULFATE HEPTAHYDRATE 40 MG/ML
2 INJECTION, SOLUTION INTRAVENOUS ONCE
Status: COMPLETED | OUTPATIENT
Start: 2021-06-13 | End: 2021-06-13

## 2021-06-13 RX ADMIN — PROPRANOLOL HYDROCHLORIDE 10 MG: 10 TABLET ORAL at 17:42

## 2021-06-13 RX ADMIN — POTASSIUM CHLORIDE 10 MEQ: 750 TABLET, FILM COATED, EXTENDED RELEASE ORAL at 17:42

## 2021-06-13 RX ADMIN — ATORVASTATIN CALCIUM 20 MG: 20 TABLET, FILM COATED ORAL at 20:51

## 2021-06-13 RX ADMIN — PROPRANOLOL HYDROCHLORIDE 10 MG: 10 TABLET ORAL at 08:23

## 2021-06-13 RX ADMIN — INSULIN LISPRO 3 UNITS: 100 INJECTION, SOLUTION INTRAVENOUS; SUBCUTANEOUS at 20:52

## 2021-06-13 RX ADMIN — FENOFIBRATE 145 MG: 145 TABLET ORAL at 08:23

## 2021-06-13 RX ADMIN — FAMOTIDINE 20 MG: 20 TABLET ORAL at 17:42

## 2021-06-13 RX ADMIN — Medication 10 ML: at 20:55

## 2021-06-13 RX ADMIN — ALPRAZOLAM 1 MG: 0.5 TABLET ORAL at 03:19

## 2021-06-13 RX ADMIN — METFORMIN HYDROCHLORIDE 500 MG: 500 TABLET, EXTENDED RELEASE ORAL at 17:41

## 2021-06-13 RX ADMIN — ALPRAZOLAM 1 MG: 0.5 TABLET ORAL at 20:51

## 2021-06-13 RX ADMIN — ENOXAPARIN SODIUM 40 MG: 40 INJECTION SUBCUTANEOUS at 08:23

## 2021-06-13 RX ADMIN — POTASSIUM CHLORIDE AND SODIUM CHLORIDE: 450; 150 INJECTION, SOLUTION INTRAVENOUS at 02:57

## 2021-06-13 RX ADMIN — Medication 10 ML: at 06:20

## 2021-06-13 RX ADMIN — POTASSIUM CHLORIDE 10 MEQ: 750 TABLET, FILM COATED, EXTENDED RELEASE ORAL at 08:22

## 2021-06-13 RX ADMIN — INSULIN LISPRO 5 UNITS: 100 INJECTION, SOLUTION INTRAVENOUS; SUBCUTANEOUS at 11:54

## 2021-06-13 RX ADMIN — ESCITALOPRAM OXALATE 10 MG: 10 TABLET ORAL at 17:42

## 2021-06-13 RX ADMIN — GLIPIZIDE 5 MG: 5 TABLET ORAL at 07:11

## 2021-06-13 RX ADMIN — MAGNESIUM SULFATE HEPTAHYDRATE 2 G: 40 INJECTION, SOLUTION INTRAVENOUS at 08:18

## 2021-06-13 RX ADMIN — THIAMINE HCL TAB 100 MG 100 MG: 100 TAB at 08:23

## 2021-06-13 RX ADMIN — ALPRAZOLAM 1 MG: 0.5 TABLET ORAL at 11:58

## 2021-06-13 RX ADMIN — Medication 10 ML: at 17:41

## 2021-06-13 RX ADMIN — INSULIN LISPRO 2 UNITS: 100 INJECTION, SOLUTION INTRAVENOUS; SUBCUTANEOUS at 07:12

## 2021-06-13 NOTE — PROGRESS NOTES
[facility]  Hospitalist Progress Note    NAME: Ericka Farmer   :  1955   MRN:  [id number]     Total duration of encounter: 3 days      Interim Hospital Summary: 72 y.o. male who presented on 6/10/2021 with DKA (diabetic ketoacidoses) ([facility] [address] He has a past medical history of Diabetes ([facility] [address] Hypertension, and Ill-defined condition (20 years ago). Pt presenting h/o N&V and poor DM control worsening over 48hrs. Pt admitted to excessive EtOH abuse. Has been on oral tx with HgA1c 6.4 on this admission, followed by ENDO in [place] Dr. Annabella Barrow. Pt also having c/o diarrhea - reporting 3 loose stools daily, requiring a diaper due to poor control. ED w/u noted for AG Met Acidosis, Mild Lactate elevation, elevated Lipase with CT c/w mild pancreatitis. WBC 4.7 and No fever. Beta-Hydroxbuterate resulted elevated 2.64 (nl <6.9) c/w alcoholic ketoacidosis. Subjective:     Chief Complaint / Reason for Physician Visit  \"no pain or nausea\".   Discussed with RN   Symptoms improved following IVF in ED  No similar history  No prior dx Pancreatitis  No new recent medications   Last year was followed by TeleMed with Dr. Annabella Barrow, RHEA  Pt has no PCP    Review of Systems:  Symptom Y/N Comments  Symptom Y/N Comments   Fever/Chills n   Chest Pain n    Poor Appetite n   Edema n    Cough n   Abdominal Pain n     Sputum n   Joint Pain n    SOB/HSIEH n   Pruritis/Rash n    Nausea/vomit n  cleared  Tolerating PT/OT     Diarrhea n    Tolerating Diet y    Diabetic   Constipation n   Other         Current Facility-Administered Medications:     thiamine mononitrate (B-1) tablet 100 mg, 100 mg, Oral, DAILY, Chantal Parker MD, 100 mg at 21 6754    metFORMIN ER (GLUCOPHAGE XR) tablet 500 mg, 500 mg, Oral, DAILY WITH DINNER, Chantal Parker MD, 500 mg at 21 1700    glipiZIDE (GLUCOTROL) tablet 5 mg, 5 mg, Oral, ACB, Chantal Parker MD, 5 mg at 21 0711    traZODone (DESYREL) tablet 50 mg, 50 mg, Oral, QHS PRN, Alexis Arthur MD, 50 mg at 06/12/21 2211    ALPRAZolam (XANAX) tablet 1 mg, 1 mg, Oral, TID PRN, Alexis Arthur MD, 1 mg at 06/13/21 0319    0.45% sodium chloride with KCl 20 mEq/L infusion, , IntraVENous, CONTINUOUS, Alexis Arthur MD, Last Rate: 100 mL/hr at 06/13/21 0257, New Bag at 06/13/21 0257    potassium chloride SR (KLOR-CON 10) tablet 10 mEq, 10 mEq, Oral, BID, Alexis Arthur MD, 10 mEq at 06/13/21 4221    escitalopram oxalate (LEXAPRO) tablet 10 mg, 10 mg, Oral, QPM, Alexis Arthur MD, 10 mg at 06/12/21 1700    fenofibrate nanocrystallized (TRICOR) tablet 145 mg, 145 mg, Oral, DAILY, Alexis Arthur MD, 145 mg at 06/13/21 8108    propranoloL (INDERAL) tablet 10 mg, 10 mg, Oral, BID, Alexis Arthur MD, 10 mg at 06/13/21 9966    atorvastatin (LIPITOR) tablet 20 mg, 20 mg, Oral, QHS, Alexis Arthur MD, 20 mg at 06/12/21 2159    sodium chloride (NS) flush 5-40 mL, 5-40 mL, IntraVENous, Q8H, Alexis Arthur MD, 10 mL at 06/13/21 0620    sodium chloride (NS) flush 5-40 mL, 5-40 mL, IntraVENous, PRN, Alexis Arthur MD    acetaminophen (TYLENOL) tablet 650 mg, 650 mg, Oral, Q6H PRN **OR** acetaminophen (TYLENOL) suppository 650 mg, 650 mg, Rectal, Q6H PRN, Alexis Arthur MD    polyethylene glycol (MIRALAX) packet 17 g, 17 g, Oral, DAILY PRN, Alexis Arthur MD    enoxaparin (LOVENOX) injection 40 mg, 40 mg, SubCUTAneous, DAILY, Alexis Arthur MD, 40 mg at 06/13/21 8142    glucose chewable tablet 16 g, 4 Tablet, Oral, PRN, Alexis Arthur MD    dextrose (D50W) injection syrg 12.5-25 g, 25-50 mL, IntraVENous, PRN, Alexis Arthur MD    glucagon [facility]) injection 1 mg, 1 mg, IntraMUSCular, PRN, Alexis Arthur MD    ondansetron [facility] injection 4 mg, 4 mg, IntraVENous, Q6H PRN, Alexis Arthur MD, 4 mg at 06/10/21 2010    famotidine (PEPCID) tablet 20 mg, 20 mg, Oral, QPM, Alexis Arthur MD, 20 mg at 06/12/21 1700    insulin lispro (HUMALOG) injection, , SubCUTAneous, AC&HS, Julio Roper, Nelia Talley MD, 2 Units at 06/13/21 5238    Objective:     VITALS:   Patient Vitals for the past 12 hrs:   Temp Pulse Resp BP SpO2   06/13/21 0740 98 °F (36.7 °C) 81 20 (!) 161/84 97 %   06/13/21 0008 97.2 °F (36.2 °C) 85 18 (!) 148/73 96 %       Intake/Output Summary (Last 24 hours) at 6/13/2021 0920  Last data filed at 6/13/2021 0617  Gross per 24 hour   Intake 2788 ml   Output 3345 ml   Net -557 ml        PHYSICAL EXAM:  General: WD, WN. Alert, cooperative, no acute distress    EENT:  EOMI. Anicteric sclerae. MMM  Resp:  CTA bilaterally, no wheezing or rales. No accessory muscle use  CV:  Regular  rhythm,  No edema  GI:  Soft, Non distended, Non tender. +Bowel sounds  Neurologic:  Alert and oriented X 3, normal speech, Mild essential B hand tremor  Psych:    Not anxious or agitated  Skin:  No rashes. No jaundice    LABS:  I reviewed today's most current labs and imaging studies.   Pertinent labs include:  Recent Labs     06/13/21  0753 06/13/21  0355 06/11/21  0530   WBC 4.3 PLEASE DISREGARD RESULTS 4.7   HGB 10.5* PLEASE DISREGARD RESULTS 10.8*   HCT 29.0* PLEASE DISREGARD RESULTS 29.2*   * PLEASE DISREGARD RESULTS 76*     Recent Labs     06/13/21  0355 06/12/21  0655 06/11/21  0530 06/10/21  1108   * 132* 132* 129*   K 3.6 3.5 3.1* 4.1   CL 96* 94* 96* 90*   CO2 25 24 23 18*   * 115* 218* 344*   BUN 15 15 18 21*   CREA 0.83 0.80 0.87 1.24   CA 9.2 8.9 8.5 9.7   MG 1.4*  --  1.4*  --    PHOS  --  3.1  --   --    ALB 3.2*  --   --  3.9   TBILI 1.2*  --   --  1.9*   ALT 70  --   --  83*     Results for Colette Roldan (MRN [id number]) as of 6/12/2021 11:33   6/10/2021 11:08 6/10/2021 16:14 6/11/2021 05:30 6/12/2021 06:55   Lipase 1,689 (H)  985 (H) 546 (H)   Lactic acid  2.3 (HH) 1.1    Troponin-I, Qt. <0.05      Hg A1c, (calc) 6.4 (H)        Results for Colette Roldan (MRN [id number]) as of 6/12/2021 11:33   6/10/2021 16:14   B-hydroxybutyrate 2.64 (H)     RADIOLOGY:  CT ABD/PELVIS:  1. Mild, uncomplicated, acute pancreatitis. 2. Moderate hepatic steatosis. Hepatomegaly. PA/LAT CXR:  The lungs are clear. The central airways are patent. The heart size is normal.  No pneumothorax or pleural effusion.   IMPRESSION:  Clear lungs. EKG 6/10:  , poor data quality, baseline wanders      Procedures: see electronic medical records for all procedures/Xrays and details which were not copied into this note but were reviewed prior to creation of Plan. Assessment / Plan:  Principal Problem:    Alcoholic ketoacidosis  More likely the cause for presenting w/o N&V a/w chronic EtOH abuse  Note elevated Beta Hydroxybuteric Acid c/w dx  Corrected with IVF  STOP ETOH - patient states he plans to, wife is informed and will be helpful with compliance  Stool for wbc / enteric bacterial panel pending  - no stool sent past 36 hrs    Active Problems:    DKA (diabetic ketoacidoses) ([facility] [address] (6/10/2021)    Type 2 diabetes mellitus, without long-term current use of insulin ([facility] [address] (6/10/2021)  Has been on oral tx for 20 yrs  No h/o DKA / admissions. A1c 6.4  Acidosis more likely from Alcoholic ketoacidosis   Pt was ill PTA 48hrs with N&V, concern for pancreatitis  CT abd c/w mild pancreatitis  Given 8u Hum R in ED  IV 1/2NS with KCl, magnesium sulfate IV x 1  Confer with pts ENDO - Dr. Mumtaz Driscoll [phone]  Resumed oral tx.  CC Humalog, BS qid ac/hs       Alcohol-induced acute pancreatitis (6/10/2021)  CLD x 48hr. Note some jump in Lipase following diet advancement  No pain or vomiting however  ? Chronic dx with diarrhea  IV Fluids, Zofran prn, No Zetia  Improved with Lipase down to 500 range 6/12, but up to 770 6/13  Cont Diabetic regular diet - f/u labs in AM       ETOH abuse (6/10/2021)  None x 48 hrs on admission  No h/o DTs ??   Chronic tremor is familial and tx with Inderal  Xanax 0.5 --> 1 mg q8h  No delirium appreciated       Hyperlipidemia (6/10/2021)  Tx Nori / Yuly Davis  Hold on Zetia which can be a/w pancreatitis       Benign familial tremor (6/10/2021)  Cont Inderal 10mg bid  Xanax prn    SAFETY:   Code Status:Full  DVT prophylaxis:Lovenox  Stress Ulcer prophylaxis: Pepcid  Bladder catheter:no  Family Contact Info:  Primary Emergency Contact: [address], Home Phone: [phone]  Bedded: [facility] Room ED01/01  Disposition: TBD, likely home Monday  Admission status:  Inpatient    Reviewed most current lab test results and cultures  YES  Reviewed most current radiology test results   YES  Review and summation of old records today    NO  Reviewed patient's current orders and MAR    YES  PMH/SH reviewed - no change compared to H&P    Care Plan discussed with:                                   Comments  Patient x     Family  x wife   RN x     Care Manager       Consultant                         Multidiciplinary team rounds were held today with , nursing, pharmacist and clinical coordinator. Patient's plan of care was discussed; medications were reviewed and discharge planning was addressed.         ____________________________________________    Total NON Critical Care TIME:  40   Minutes        Comments   >50% of visit spent in counseling and coordination of care   x      Signed: Anna Beltran MD  [facility] Hospitalist  [phone]

## 2021-06-13 NOTE — ROUTINE PROCESS
Bedside and Verbal shift change report given to GERARDO Mandujano RN (oncoming nurse) by Jake Pearce RN 
 (offgoing nurse). Report included the following information SBAR and Kardex.

## 2021-06-13 NOTE — PROGRESS NOTES
Bedside shift change report given to Marina Neely (oncoming nurse) by Krishna Powell (offgoing nurse). Report included the following information SBAR, Kardex and MAR.

## 2021-06-13 NOTE — PROGRESS NOTES
Problem: Falls - Risk of  Goal: *Absence of Falls  Description: Document Chema Alexis Fall Risk and appropriate interventions in the flowsheet.   Outcome: Progressing Towards Goal  Note: Fall Risk Interventions:       Mentation Interventions: Bed/chair exit alarm    Medication Interventions: Assess postural VS orthostatic hypotension    Elimination Interventions: Bed/chair exit alarm, Call light in reach, Urinal in reach              Problem: General Medical Care Plan  Goal: *Vital signs within specified parameters  Outcome: Progressing Towards Goal  Goal: *Labs within defined limits  Outcome: Progressing Towards Goal  Goal: *Absence of infection signs and symptoms  Outcome: Progressing Towards Goal  Goal: *Optimal pain control at patient's stated goal  Outcome: Progressing Towards Goal  Goal: *Skin integrity maintained  Outcome: Progressing Towards Goal  Goal: *Fluid volume balance  Outcome: Progressing Towards Goal  Goal: *Optimize nutritional status  Outcome: Progressing Towards Goal  Goal: *Anxiety reduced or absent  Outcome: Progressing Towards Goal  Goal: *Progressive mobility and function (eg: ADL's)  Outcome: Progressing Towards Goal     Problem: Pancreatitis  Goal: *Control of acute pain  Outcome: Progressing Towards Goal

## 2021-06-14 ENCOUNTER — APPOINTMENT (OUTPATIENT)
Dept: MRI IMAGING | Age: 66
DRG: 640 | End: 2021-06-14
Attending: INTERNAL MEDICINE
Payer: COMMERCIAL

## 2021-06-14 VITALS
SYSTOLIC BLOOD PRESSURE: 185 MMHG | DIASTOLIC BLOOD PRESSURE: 110 MMHG | WEIGHT: 218.2 LBS | HEIGHT: 73 IN | TEMPERATURE: 98.6 F | RESPIRATION RATE: 20 BRPM | OXYGEN SATURATION: 99 % | BODY MASS INDEX: 28.92 KG/M2 | HEART RATE: 98 BPM

## 2021-06-14 LAB
ALT SERPL-CCNC: 66 U/L (ref 12–78)
ANION GAP SERPL CALC-SCNC: 6 MMOL/L (ref 5–15)
BUN SERPL-MCNC: 15 MG/DL (ref 6–20)
BUN/CREAT SERPL: 19 (ref 12–20)
CALCIUM SERPL-MCNC: 8.8 MG/DL (ref 8.5–10.1)
CHLORIDE SERPL-SCNC: 97 MMOL/L (ref 97–108)
CO2 SERPL-SCNC: 28 MMOL/L (ref 21–32)
COMMENT, HOLDF: NORMAL
CREAT SERPL-MCNC: 0.79 MG/DL (ref 0.7–1.3)
GLUCOSE BLD STRIP.AUTO-MCNC: 155 MG/DL (ref 65–117)
GLUCOSE BLD STRIP.AUTO-MCNC: 189 MG/DL (ref 65–117)
GLUCOSE SERPL-MCNC: 189 MG/DL (ref 65–100)
LIPASE SERPL-CCNC: 779 U/L (ref 73–393)
MAGNESIUM SERPL-MCNC: 1.4 MG/DL (ref 1.6–2.4)
POTASSIUM SERPL-SCNC: 3.8 MMOL/L (ref 3.5–5.1)
SAMPLES BEING HELD,HOLD: NORMAL
SERVICE CMNT-IMP: ABNORMAL
SERVICE CMNT-IMP: ABNORMAL
SODIUM SERPL-SCNC: 131 MMOL/L (ref 136–145)

## 2021-06-14 PROCEDURE — 84460 ALANINE AMINO (ALT) (SGPT): CPT

## 2021-06-14 PROCEDURE — 74183 MRI ABD W/O CNTR FLWD CNTR: CPT

## 2021-06-14 PROCEDURE — 83690 ASSAY OF LIPASE: CPT

## 2021-06-14 PROCEDURE — 83735 ASSAY OF MAGNESIUM: CPT

## 2021-06-14 PROCEDURE — 74011250637 HC RX REV CODE- 250/637: Performed by: INTERNAL MEDICINE

## 2021-06-14 PROCEDURE — 36415 COLL VENOUS BLD VENIPUNCTURE: CPT

## 2021-06-14 PROCEDURE — A9585 GADOBUTROL INJECTION: HCPCS | Performed by: INTERNAL MEDICINE

## 2021-06-14 PROCEDURE — 74011636637 HC RX REV CODE- 636/637: Performed by: INTERNAL MEDICINE

## 2021-06-14 PROCEDURE — 82962 GLUCOSE BLOOD TEST: CPT

## 2021-06-14 PROCEDURE — 80048 BASIC METABOLIC PNL TOTAL CA: CPT

## 2021-06-14 PROCEDURE — 74011250636 HC RX REV CODE- 250/636: Performed by: INTERNAL MEDICINE

## 2021-06-14 RX ORDER — MAGNESIUM SULFATE HEPTAHYDRATE 40 MG/ML
2 INJECTION, SOLUTION INTRAVENOUS ONCE
Status: COMPLETED | OUTPATIENT
Start: 2021-06-14 | End: 2021-06-14

## 2021-06-14 RX ORDER — POTASSIUM CHLORIDE 750 MG/1
10 TABLET, FILM COATED, EXTENDED RELEASE ORAL 2 TIMES DAILY
Qty: 30 TABLET | Refills: 0 | Status: SHIPPED | OUTPATIENT
Start: 2021-06-14 | End: 2021-07-27

## 2021-06-14 RX ORDER — LANOLIN ALCOHOL/MO/W.PET/CERES
400 CREAM (GRAM) TOPICAL DAILY
Qty: 15 TABLET | Refills: 0 | Status: SHIPPED | OUTPATIENT
Start: 2021-06-15 | End: 2021-07-27

## 2021-06-14 RX ORDER — ASPIRIN 325 MG/1
100 TABLET, FILM COATED ORAL DAILY
Qty: 10 TABLET | Refills: 0 | Status: SHIPPED | OUTPATIENT
Start: 2021-06-15 | End: 2021-06-25

## 2021-06-14 RX ORDER — ALPRAZOLAM 0.5 MG/1
0.5 TABLET ORAL
Qty: 20 TABLET | Refills: 0 | Status: SHIPPED | OUTPATIENT
Start: 2021-06-14

## 2021-06-14 RX ORDER — ESCITALOPRAM OXALATE 10 MG/1
10 TABLET ORAL DAILY
Qty: 30 TABLET | Refills: 0 | Status: SHIPPED | OUTPATIENT
Start: 2021-06-14

## 2021-06-14 RX ORDER — LANOLIN ALCOHOL/MO/W.PET/CERES
400 CREAM (GRAM) TOPICAL DAILY
Status: DISCONTINUED | OUTPATIENT
Start: 2021-06-14 | End: 2021-06-14 | Stop reason: HOSPADM

## 2021-06-14 RX ADMIN — PROPRANOLOL HYDROCHLORIDE 10 MG: 10 TABLET ORAL at 08:30

## 2021-06-14 RX ADMIN — INSULIN LISPRO 2 UNITS: 100 INJECTION, SOLUTION INTRAVENOUS; SUBCUTANEOUS at 05:54

## 2021-06-14 RX ADMIN — ENOXAPARIN SODIUM 40 MG: 40 INJECTION SUBCUTANEOUS at 08:34

## 2021-06-14 RX ADMIN — Medication 400 MG: at 08:30

## 2021-06-14 RX ADMIN — GLIPIZIDE 5 MG: 5 TABLET ORAL at 05:55

## 2021-06-14 RX ADMIN — ALPRAZOLAM 1 MG: 0.5 TABLET ORAL at 14:01

## 2021-06-14 RX ADMIN — GADOBUTROL 10 ML: 604.72 INJECTION INTRAVENOUS at 11:53

## 2021-06-14 RX ADMIN — MAGNESIUM SULFATE 2 G: 2 INJECTION INTRAVENOUS at 08:19

## 2021-06-14 RX ADMIN — Medication 10 ML: at 05:57

## 2021-06-14 RX ADMIN — POTASSIUM CHLORIDE 10 MEQ: 750 TABLET, FILM COATED, EXTENDED RELEASE ORAL at 08:30

## 2021-06-14 RX ADMIN — FENOFIBRATE 145 MG: 145 TABLET ORAL at 08:30

## 2021-06-14 RX ADMIN — INSULIN LISPRO 2 UNITS: 100 INJECTION, SOLUTION INTRAVENOUS; SUBCUTANEOUS at 12:45

## 2021-06-14 RX ADMIN — THIAMINE HCL TAB 100 MG 100 MG: 100 TAB at 08:30

## 2021-06-14 RX ADMIN — ALPRAZOLAM 1 MG: 0.5 TABLET ORAL at 05:50

## 2021-06-14 RX ADMIN — Medication 10 ML: at 14:03

## 2021-06-14 NOTE — PROGRESS NOTES
Problem: Falls - Risk of  Goal: *Absence of Falls  Description: Document Eneida Nicole Fall Risk and appropriate interventions in the flowsheet.   Outcome: Progressing Towards Goal  Note: Fall Risk Interventions:       Mentation Interventions: Door open when patient unattended    Medication Interventions: Patient to call before getting OOB, Teach patient to arise slowly    Elimination Interventions: Call light in reach, Patient to call for help with toileting needs, Stay With Me (per policy)              Problem: General Medical Care Plan  Goal: *Vital signs within specified parameters  Outcome: Progressing Towards Goal  Goal: *Labs within defined limits  Outcome: Resolved/Met  Goal: *Absence of infection signs and symptoms  Outcome: Resolved/Met  Goal: *Optimal pain control at patient's stated goal  Outcome: Resolved/Met  Goal: *Skin integrity maintained  Outcome: Progressing Towards Goal  Goal: *Fluid volume balance  Outcome: Resolved/Met  Goal: *Optimize nutritional status  Outcome: Resolved/Met  Goal: *Anxiety reduced or absent  Outcome: Progressing Towards Goal  Goal: *Progressive mobility and function (eg: ADL's)  Outcome: Resolved/Met     Problem: Pancreatitis  Goal: *Control of acute pain  Outcome: Progressing Towards Goal

## 2021-06-14 NOTE — DISCHARGE SUMMARY
[facility]  Hospitalist Discharge Summary    Patient ID:  Alcides Al  [id number]  72 y.o.  1955    PCP on record: Eris Not On File, NP    Admit date: 6/10/2021  Discharge date and time: 6/14/2021     DISCHARGE DIAGNOSIS:    Principal Problem:    DKA (diabetic ketoacidoses) ([facility] (6/10/2021)    Active Problems:    Type 2 diabetes mellitus, without long-term current use of insulin ([address] (6/10/2021)    Alcohol-induced acute pancreatitis (7/10/1787)    Alcoholic ketoacidosis (0/12/8711)    ETOH abuse (6/10/2021)    Hyperlipidemia (6/10/2021)    Benign familial tremor (6/10/2021)    Depression (6/12/2021)    CONSULTATIONS:  None    Excerpted HPI from H&P of Courtney Whitfield MD:  72 y.o. male presenting for admission to [facility] for further evaluation and treatment for DKA (diabetic ketoacidoses) ([facility] He  has a past medical history of Diabetes ([facility] and Hypertension. Jaimie Jamesis He to the ED with complaints of weakness and symptoms of nausea and vomiting over the past 48 hours. He states he has not been able to tolerate any of his medications or nutrition. He has retained some liquids but no medicines. He has generalized weakness. He denies any chest pain or shortness of breath. He denies any severe abdominal pain. He has noted some loose stooling. Questions whether his symptoms began after eating some \"bad\" tunafish. He has not experienced any fevers or chills and has had no ill contacts. He does admit to drinking to some excess over the past few monthsdrinking 1/2-1 full fifth of liquor daily. There is no prior history of alcohol withdrawal, DTs or seizures.     Assessment in the ED was remarkable for a anion gap metabolic acidosis. He has been a diabetic for 20 years on oral therapy. Reports fasting glucose use low 100s. He has recently been taking glyburide 5 mg daily as well as Metformin 500 mg daily.   He rarely takes a double morning dose when his fasting glucose is over 175. He has recorded blood sugars up to 350 today. He was alarmed by his blood sugars increasing in spite of no nutritional intake. ED labs were remarkable for an elevated serum lipase. He has no prior history of known pancreatitis. CBC was remarkable for a normal white count. Received 8 units of Humulin R along with 2000 cc of saline in the ED. A follow-up basic panel is pending. Serum lipase and hydroxybutyrate are pending.     Patient [facility] He has not had follow-up during the past year due to [facility] telemetry visits. Not had recent lab work. He is unaware of his A1c value.  ______________________________________________________________________  DISCHARGE SUMMARY/HOSPITAL COURSE:  for full details see H&P, daily progress notes, labs, consult notes. Pt presenting h/o N&V and poor DM control worsening over 48hrs. Pt admitted to excessive EtOH abuse. Has been on oral tx with HgA1c 6.4 on this admission, followed by [facility] in [address] [facility] [address] [place]. Pt also having c/o diarrhea - reporting 3 loose stools daily, requiring a diaper due to poor control. ED w/u noted for AG Met Acidosis, Mild Lactate elevation, elevated Lipase with CT c/w mild pancreatitis. WBC 4.7 and No fever. Beta-Hydroxbuterate resulted elevated 2.64 (nl <5.5) c/w alcoholic ketoacidosis.      Principal Problem:    Alcoholic ketoacidosis  More likely the cause for presenting w/o N&V a/w chronic EtOH abuse  Note elevated Beta Hydroxybuteric Acid c/w dx  Corrected with IVF  STOP ETOH - patient states he plans to, wife is informed and will be helpful with compliance  Stool for wbc / enteric bacterial panel pending  - no stool sent as diarrhea cleared w/o treatment  Pt plans to abstain from use EtOH - Lexapro may improve his motivation / ability      Active Problems:    DKA (diabetic ketoacidoses) ([address] (6/10/2021)    Type 2 diabetes mellitus, without long-term current use of insulin ([address] (6/10/2021)  Has been on oral tx for 20 yrs  No h/o DKA / admissions. A1c 6.4  Acidosis more likely from Alcoholic ketoacidosis   Pt was ill PTA 48hrs with N&V, concern for pancreatitis  CT abd c/w mild pancreatitis  Given 8u Hum R in ED  IV 1/2NS with KCl, magnesium sulfate IV x 1  Confer with pts ENDO - Dr. Rona Camarena [phone]  Resumed oral tx when stable. CC Humalog, BS qid ac/hs  Hydroxybutyrate drawn on admission returned several days later elevated       Alcohol-induced acute pancreatitis (6/10/2021)  CLD x 48hr. Note some jump in Lipase following diet advancement  No pain or vomiting however  Some loose poorly controlled stools that improved w/o treatment  Stool for WBC / Bacterial DNA testing was ordered but was not obtained (he got better)  IV Fluids, Zofran prn, No Zetia  Improved with Lipase down to 500 range 6/12, but up to 770 6/13 and 6/14  Cont Diabetic regular diet - f/u labs in AM  F/u exam with MRI Scan noted improvement in the alexa-pancreatic inflammation and no signs of mass       ETOH abuse (6/10/2021)  None x 48 hrs on admission  No h/o DTs ?? Chronic tremor is familial and tx with Inderal  Xanax 0.5 --> 1 mg q8h  No delirium appreciated       Hyperlipidemia (6/10/2021)  Tx Crestor / Humphries Uziel  Hold on Zetia which can be a/w pancreatitis       Benign familial tremor (6/10/2021)  Cont Inderal 10mg bid  Xanax prn      Depression  Following admission the patient was restarted on Lexapro   Patient reported that he had some emotional lability and Dr. Rona Camarena started him on Lexapro which she had continued to take with results  His comment was that he cried through the [address] movie one evening -a marked change from his usual affect  It was hoped that continued treatment with Lexapro would assist with his ability to abstain from alcohol    ______________________________________________________________________  Patient seen and examined by me on discharge day.   Pertinent Findings:  Visit Vitals  BP (!) 174/95 (BP 1 Location: Left upper arm, BP Patient Position: At rest)   Pulse 88   Temp 98.2 °F (36.8 °C)   Resp 18   Ht 6' 1\" (1.854 m)   Wt 99 kg (218 lb 3.2 oz)   SpO2 98%   BMI 28.79 kg/m²     Gen:    Not in distress  Chest: Nonlabored respiration, Clear lungs  CVS:   Regular rhythm.   No edema  Abd:  Soft, not distended, not tender  Neuro:  Alert, nonfocal, weak    LABS:  Results for Osmani Porter (MRN [id number]) as of 6/14/2021 16:16   6/12/2021 06:48 6/12/2021 11:59 6/12/2021 16:40 6/12/2021 21:51 6/13/2021 07:03 6/13/2021 11:10 6/13/2021 16:54 6/13/2021 20:48 6/14/2021 05:48 6/14/2021 11:57   GLU -  (H) 252 (H) 128 (H) 175 (H) 179 (H) 290 (H) 134 (H) 290 (H) 189 (H) 155 (H)     Results for Osmani Porter (MRN [id number]) as of 6/14/2021 16:16   6/10/2021 11:08 6/11/2021 05:30 6/13/2021 07:53   WBC 7.4 4.7 4.3   NRBC 0.0 0.0    RBC 3.64 (L) 2.70 (L) 2.67 (L)   HGB 14.3 10.8 (L) 10.5 (L)   HCT 38.3 29.2 (L) 29.0 (L)   .2 (H) 108.1 (H) 108.6 (H)   MCH 39.3 (H) 40.0 (H) 39.3 (H)   MCHC 37.3 (H) 37.0 (H) 36.2   RDW 12.3 12.4 12.0   PLATELET 938 76 (L) 320 (L)   MPV 9.8 10.2 9.2   NEUTROPHILS 88 (H) 77 (H) 78 (H)   BAND NEUTR   8   LYMPHOCYTE 5 (L) 15 14   MONOCYTES 7 8 0 (L)     Results for Osmani Porter (MRN [id number]) as of 6/14/2021 16:16   6/10/2021 14:25   Color YELLOW/STRAW   Appearance CLEAR   Specific gravity 1.025   pH (UA) 6.0   Protein >300 (A)   Glucose >1,000 (A)   Ketone 40 (A)   Blood MODERATE (A)   Bilirubin UA, confirm Negative   Urobilinogen 1.0   Nitrites Negative   Leukocyte Esterase Negative   Epithelial cells FEW   WBC 0-4   RBC 0-5   Bacteria Negative     Results for Osmani Porter (MRN [id number]) as of 6/14/2021 16:16   6/10/2021 11:08 6/10/2021 16:14 6/11/2021 05:30 6/12/2021 06:55 6/13/2021 03:55 6/14/2021 05:38   Sodium 129 (L) 133 (L) 132 (L) 132 (L) 132 (L) 131 (L)   Potassium 4.1 3.9 3.1 (L) 3.5 3.6 3.8   Chloride 90 (L) 95 (L) 96 (L) 94 (L) 96 (L) 97   CO2 18 (L) 19 (L) 23 24 25 28   Anion gap 21 (H) 19 (H) 13 14 11 6   Glucose 344 (H) 183 (H) 218 (H) 115 (H) 164 (H) 189 (H)   BUN 21 (H) 21 (H) 18 15 15 15   Creatinine 1.24 1.08 0.87 0.80 0.83 0.79   BUN/Cr ratio 17 19 21 (H) 19 18 19   Calcium 9.7 8.8 8.5 8.9 9.2 8.8   Phosphorus    3.1     Magnesium   1.4 (L)  1.4 (L) 1.4 (L)   GFRnon-AA 59 (L) >60 >60 >60 >60 >60   Bilirubin, total 1.9 (H)    1.2 (H)    Protein, total 8.0    6.9    Albumin 3.9    3.2 (L)    Globulin 4.1 (H)    3.7    A-G Ratio 1.0 (L)    0.9 (L)    ALT 83 (H)    70 66   AST 83 (H)    89 (H)    Alk. phos 108    96    Lipase 1,689 (H)  985 (H) 546 (H) 770 (H) 779 (H)   Lactic acid  2.3 (HH) 1.1      Troponin-I, Qt. <0.05        Hg A1c, (calc) 6.4 (H)        Est. aver glu 137          Results for Mandy Almaraz (MRN [id number]) as of 6/14/2021 16:16   6/10/2021 11:40   VENOUS PH 7.45 (H)   VENOUS PCO2 25.9 (L)   VENOUS PO2 81 (H)   VENOUS BICARBONATE 18 (L)   VENOUS BASE DEFICIT 4.6   VENOUS O2 SATURATION 97 (H)   Sample source VENOUS BLOOD   SITE OTHER   O2 METHOD ROOM AIR     Results for Mandy Almaraz (MRN [id number]) as of 6/12/2021 11:33    6/10/2021 11:08 6/10/2021 16:14 6/11/2021 05:30 6/12/2021 06:55   Lipase 1,689 (H)   985 (H) 546 (H)   Lactic acid   2.3 (HH) 1.1     Troponin-I, Qt. <0.05         Hg A1c, (calc) 6.4 (H)            Results for Mandy Almaraz (MRN [id number]) as of 6/12/2021 11:33    6/10/2021 16:14   B-hydroxybutyrate 2.64 (H)        RADIOLOGY:  CT ABD/PELVIS:  1. Mild, uncomplicated, acute pancreatitis. 2. Moderate hepatic steatosis. Hepatomegaly.     PA/LAT CXR:  The lungs are clear. The central airways are patent. The heart size is normal.  No pneumothorax or pleural effusion.   IMPRESSION:  Clear lungs.     MRI ABD 6/14:  Mild peripancreatic fat stranding around the head and uncinate process on  6/10/2021 has resolved.   Loss of signal on out of phase images is consistent with moderate to severe  hepatic steatosis. Steatosis is significant enough that the liver is  hyperintense to spleen on T2-weighted images.   No pancreatic or biliary ductal dilation. There is a tiny hiatal hernia. The  stomach, duodenum, gallbladder, spleen, adrenals, kidneys, and visualized  portions of the bowel, are normal. No free fluid and no abdominal  lymphadenopathy.   IMPRESSION  1. Resolved peripancreatic fat stranding/acute pancreatitis. 2. Moderate to severe hepatic steatosis.       EKG 6/10:  , poor data quality, baseline wanders     _______________________________________________________________________  DISCHARGE MEDICATIONS:   Current Discharge Medication List      START taking these medications    Details   potassium chloride SR (KLOR-CON 10) 10 mEq tablet Take 1 Tablet by mouth two (2) times a day. Qty: 30 Tablet, Refills: 0  Start date: 6/14/2021      thiamine mononitrate (B-1) 100 mg tablet Take 1 Tablet by mouth daily for 10 days. Qty: 10 Tablet, Refills: 0  Start date: 6/15/2021, End date: 6/25/2021      magnesium oxide (MAG-OX) 400 mg tablet Take 1 Tablet by mouth daily. Qty: 15 Tablet, Refills: 0  Start date: 6/15/2021         CONTINUE these medications which have CHANGED    Details   escitalopram oxalate (LEXAPRO) 10 mg tablet Take 1 Tablet by mouth daily. Qty: 30 Tablet, Refills: 0  Start date: 6/14/2021         CONTINUE these medications which have NOT CHANGED    Details   ALPRAZolam (XANAX) 0.5 mg tablet 1/2 tab(s)      FreeStyle Marita 14 Day Sensor kit USE AS DIRECTED EVERY 14 DAYS      propranolol (INDERAL) 10 mg tablet Take 10 mg by mouth two (2) times a day. rosuvastatin (CRESTOR) 10 mg tablet Take 10 mg by mouth nightly. glyBURIDE-metFORMIN (GLUCOVANCE) 5-500 mg per tablet Take 2 Tabs by mouth two (2) times daily (with meals). fenofibrate micronized (LOFIBRA) 134 mg capsule Take 134 mg by mouth every morning.          STOP taking these medications       tadalafiL (CIALIS) 5 mg tablet Comments:   Reason for Stopping:         losartan-hydroCHLOROthiazide (HYZAAR) 100-25 mg per tablet Comments:   Reason for Stopping:         losartan-hydroCHLOROthiazide (HYZAAR) 50-12.5 mg per tablet Comments:   Reason for Stopping:             My Recommended  Diet: Diabetic  Activity: Ad Kassy  Wound Care: none  Follow-up labs: needs f/u Lipase with PCP. Monitor BS closely.     No Alcohol  Monitor BS closely  Continue usual meds  Plan f/u Dr. Cyndee Alvarez in about one week  We are making local Primary Care Appt  Need f/u Lipase, Appt with GI specialist if continued elevations  PA Rosa Newman MD   [phone]  ______________________________________________________________________  DISPOSITION:    Home with Family: x   Home with HH/PT/OT/RN:    SNF/LTC:    JAY JAY:    OTHER:        Condition at Discharge:  Stable  _____________________________________________________________________  Follow up with:   PCP : Eris, Not On File, NP  Follow-up Information     Follow up With Specialties Details Why Contact Info    Juliana Major MD Endocrinology In 1 week  [address] [facility]   Osteoporosis  [facility] [address]  [phone]      [id number] [place] [address]  In 1 week NEW  [address]  [phone]          Total time in minutes spent coordinating this discharge (includes going over instructions, follow-up, prescriptions, and preparing report for sign off to her PCP) :35 minutes    Signed:  Chelsie Weaver MD  [facility] Hospitalist  [phone]

## 2021-06-14 NOTE — PROGRESS NOTES
Discharge instructions reviewed with patient  Personal belongings returned: cash that was locked in safe, counted and verified with [employer] meds returned: n/a  To front entrance via ambulating  Discharged home with  Significant other @ [phone].

## 2021-06-14 NOTE — DISCHARGE INSTRUCTIONS
No Alcohol  Monitor BS closely  Continue usual meds  Plan f/u Dr. [place] Batista in about one week  We are making local Primary Care Appt  Need f/u Lipase, Appt with GI specialist if continued elevations  BOBBI Guillory MD   [phone]  DISCHARGE SUMMARY from Nurse    PATIENT INSTRUCTIONS:    After general anesthesia or intravenous sedation, for 24 hours or while taking prescription Narcotics:  · Limit your activities  · Do not drive and operate hazardous machinery  · Do not make important personal or business decisions  · Do  not drink alcoholic beverages  · If you have not urinated within 8 hours after discharge, please contact your surgeon on call. Report the following to your surgeon:  · Excessive pain, swelling, redness or odor of or around the surgical area  · Temperature over 100.5  · Nausea and vomiting lasting longer than 4 hours or if unable to take medications  · Any signs of decreased circulation or nerve impairment to extremity: change in color, persistent  numbness, tingling, coldness or increase pain  · Any questions    What to do at Home:  Recommended activity: Activity as tolerated,     If you experience any of the following symptoms return of symptoms, please follow up with PCP or return to ED. *  Please give a list of your current medications to your Primary Care Provider. *  Please update this list whenever your medications are discontinued, doses are      changed, or new medications (including over-the-counter products) are added. *  Please carry medication information at all times in case of emergency situations. These are general instructions for a healthy lifestyle:    No smoking/ No tobacco products/ Avoid exposure to second hand smoke  Surgeon General's Warning:  Quitting smoking now greatly reduces serious risk to your health.     Obesity, smoking, and sedentary lifestyle greatly increases your risk for illness    A healthy diet, regular physical exercise & weight monitoring are important for maintaining a healthy lifestyle    You may be retaining fluid if you have a history of heart failure or if you experience any of the following symptoms:  Weight gain of 3 pounds or more overnight or 5 pounds in a week, increased swelling in our hands or feet or shortness of breath while lying flat in bed. Please call your doctor as soon as you notice any of these symptoms; do not wait until your next office visit. The discharge information has been reviewed with the patient. The patient verbalized understanding. Discharge medications reviewed with the patient and appropriate educational materials and side effects teaching were provided.   ___________________________________________________________________________________________________________________________________

## 2021-07-27 ENCOUNTER — OFFICE VISIT (OUTPATIENT)
Dept: FAMILY MEDICINE CLINIC | Age: 66
End: 2021-07-27
Payer: COMMERCIAL

## 2021-07-27 VITALS
TEMPERATURE: 97.6 F | WEIGHT: 214.38 LBS | BODY MASS INDEX: 28.41 KG/M2 | HEIGHT: 73 IN | DIASTOLIC BLOOD PRESSURE: 69 MMHG | SYSTOLIC BLOOD PRESSURE: 129 MMHG | HEART RATE: 80 BPM | RESPIRATION RATE: 18 BRPM | OXYGEN SATURATION: 98 %

## 2021-07-27 DIAGNOSIS — G25.0 BENIGN FAMILIAL TREMOR: Chronic | ICD-10-CM

## 2021-07-27 DIAGNOSIS — F32.5 MAJOR DEPRESSIVE DISORDER WITH SINGLE EPISODE, IN FULL REMISSION (HCC): ICD-10-CM

## 2021-07-27 DIAGNOSIS — I10 ESSENTIAL HYPERTENSION: ICD-10-CM

## 2021-07-27 DIAGNOSIS — E11.9 CONTROLLED TYPE 2 DIABETES MELLITUS WITHOUT COMPLICATION, WITHOUT LONG-TERM CURRENT USE OF INSULIN (HCC): Primary | ICD-10-CM

## 2021-07-27 DIAGNOSIS — E78.00 HYPERCHOLESTEROLEMIA: ICD-10-CM

## 2021-07-27 DIAGNOSIS — Z11.59 ENCOUNTER FOR HEPATITIS C SCREENING TEST FOR LOW RISK PATIENT: ICD-10-CM

## 2021-07-27 PROBLEM — E87.29 ALCOHOLIC KETOACIDOSIS: Status: RESOLVED | Noted: 2021-06-12 | Resolved: 2021-07-27

## 2021-07-27 PROBLEM — E11.10 DKA (DIABETIC KETOACIDOSES): Status: RESOLVED | Noted: 2021-06-10 | Resolved: 2021-07-27

## 2021-07-27 PROBLEM — K85.20 ALCOHOL-INDUCED ACUTE PANCREATITIS: Status: RESOLVED | Noted: 2021-06-10 | Resolved: 2021-07-27

## 2021-07-27 PROCEDURE — 36415 COLL VENOUS BLD VENIPUNCTURE: CPT | Performed by: FAMILY MEDICINE

## 2021-07-27 PROCEDURE — 99204 OFFICE O/P NEW MOD 45 MIN: CPT | Performed by: FAMILY MEDICINE

## 2021-07-27 RX ORDER — GUAIFENESIN 100 MG/5ML
81 LIQUID (ML) ORAL DAILY
COMMUNITY

## 2021-07-27 RX ORDER — ZOLPIDEM TARTRATE 12.5 MG/1
TABLET, FILM COATED, EXTENDED RELEASE ORAL
COMMUNITY
Start: 2021-07-15

## 2021-07-27 NOTE — PROGRESS NOTES
Silverio Evans is a 72 y.o. male who presents with the following:  Chief Complaint   Patient presents with    Diabetes    Hypertension    Cholesterol Problem    Depression    Tremors     Benign essential tremor       Patient has diabetes mellitus which may stem from the alcoholic pancreatitis that he had but in any event is doing well on current medication with his last A1c in June was 6.4 on his glyburide Metformin 5/502 tablets twice daily. For the patient's essential tremor he is using propranolol 10 mg 2 tablets twice daily and this seems to keep it under fair control. The patient's hypertension is doing well on his current medications and salt restriction without any chest pain shortness of breath nor edema. The patient's depression is doing much better on S-Citalopram 1 tablet daily 10 mg and his anxiety is doing much better with alprazolam 1 mg tablets half a tablet 3 times a day and he still having to take zolpidem ER 12.5 mg at night for him to sleep but on this mixture the patient states he has not had any drinking but does admit to having a social drinker every now and then. The patient's cholesterol is been managed with rosuvastatin and he initially was on medicine for triglycerides however he is no longer taking this. He does take 281 mg aspirin daily. No Known Allergies    Current Outpatient Medications   Medication Sig    aspirin (Dottie Chewable Aspirin) 81 mg chewable tablet Take 81 mg by mouth daily.  zolpidem CR (AMBIEN CR) 12.5 mg tablet TAKE 1 TABLET BY MOUTH ONCE A DAY AT BEDTIME AS NEEDED    escitalopram oxalate (LEXAPRO) 10 mg tablet Take 1 Tablet by mouth daily.  ALPRAZolam (XANAX) 0.5 mg tablet Take 1 Tablet by mouth three (3) times daily as needed for Anxiety. Max Daily Amount: 1.5 mg.  Indications: anxiousness associated with depression    FreeStyle Marita 14 Day Sensor kit USE AS DIRECTED EVERY 14 DAYS    propranolol (INDERAL) 10 mg tablet Take 10 mg by mouth two (2) times a day.  rosuvastatin (CRESTOR) 10 mg tablet Take 10 mg by mouth nightly.  glyBURIDE-metFORMIN (GLUCOVANCE) 5-500 mg per tablet Take 2 Tabs by mouth two (2) times daily (with meals). No current facility-administered medications for this visit.        Past Medical History:   Diagnosis Date    Alcohol-induced acute pancreatitis 7/41/3272    Alcoholic ketoacidosis 0/48/9468    Diabetes [facility]     DKA (diabetic ketoacidoses) ([facility] 6/10/2021    Hypertension     Ill-defined condition 20 years ago    high cholesterol    Joint pain        Past Surgical History:   Procedure Laterality Date    [place] CARDIAC SURG PROCEDURE UNLIST      skin       Family History   Problem Relation Age of Onset    Arthritis-osteo Mother     Hypertension Father     Heart Disease Sister     Prostate Cancer Brother     Cancer Maternal Uncle     Cancer Paternal Aunt        Social History     Socioeconomic History    Marital status:      Spouse name: Angel Gordon Number of children: 4    Years of education: 15    Highest education level: 12th grade   Tobacco Use    Smoking status: Current Every Day Smoker     Packs/day: 0.25     Years: 25.00     Pack years: 6.25    Smokeless tobacco: Never Used    Tobacco comment: staff to educate   Vaping Use    Vaping Use: Never used   Substance and Sexual Activity    Alcohol use: Not Currently     Comment: 1/2 to 1/5 Vodka daily    Drug use: Never    Sexual activity: Yes   Other Topics Concern     Service No    Blood Transfusions No    Caffeine Concern No    Occupational Exposure No    Hobby Hazards No    Sleep Concern Yes     Comment: takes sleepimg med    Stress Concern Yes     Comment: Business is stressful    Weight Concern No    Special Diet No    Back Care No    Exercise No    Bike Helmet Yes    Seat Belt Yes    Self-Exams Yes     Comment: hemocult     Social Determinants of Health     Financial Resource Strain:     Difficulty of Paying Living Expenses:    Food Insecurity:     Worried About Running Out of Food in the Last Year:     [address] in the Last Year:    Transportation Needs:     Lack of Transportation (Medical):  Lack of Transportation (Non-Medical):    Physical Activity:     Days of Exercise per Week:     Minutes of Exercise per Session:    Stress:     Feeling of Stress :    Social Connections:     Frequency of Communication with Friends and Family:     Frequency of Social Gatherings with Friends and Family:     Attends [employer] Services:     Active Member of Clubs or Organizations:     Attends Club or Organization Meetings:     Marital Status:        Review of Systems   Constitutional: Negative for chills, fever, malaise/fatigue and weight loss. HENT: Negative for congestion, hearing loss, sore throat and tinnitus. Eyes: Negative for blurred vision, pain and discharge. Respiratory: Negative for cough, shortness of breath and wheezing. Cardiovascular: Negative for chest pain, palpitations, orthopnea, claudication and leg swelling. Gastrointestinal: Negative for abdominal pain, constipation and heartburn. Genitourinary: Negative for dysuria, frequency and urgency. Musculoskeletal: Negative for falls, joint pain and myalgias. Skin: Negative for itching and rash. Neurological: Negative for dizziness, tingling, tremors and headaches. Endo/Heme/Allergies: Negative for environmental allergies and polydipsia. Psychiatric/Behavioral: Negative for depression and substance abuse. The patient is not nervous/anxious. Visit Vitals  /69 (BP 1 Location: Left upper arm)   Pulse 80   Temp 97.6 °F (36.4 °C) (Temporal)   Resp 18   Ht 6' 1\" (1.854 m)   Wt 214 lb 6 oz (97.2 kg)   SpO2 98%   BMI 28.28 kg/m²     Physical Exam  Vitals reviewed. Constitutional:       General: He is not in acute distress. Appearance: Normal appearance. He is not ill-appearing.    HENT:      Head: Normocephalic and atraumatic. Right Ear: Tympanic membrane, ear canal and external ear normal.      Left Ear: Tympanic membrane, ear canal and external ear normal.      Nose: Nose normal. No congestion or rhinorrhea. Mouth/Throat:      Mouth: Mucous membranes are moist.      Pharynx: No oropharyngeal exudate or posterior oropharyngeal erythema. Eyes:      Extraocular Movements: Extraocular movements intact. Conjunctiva/sclera: Conjunctivae normal.      Pupils: Pupils are equal, round, and reactive to light. Comments: Pupil iris and anterior chamber normal.   Neck:      Trachea: No tracheal deviation. Cardiovascular:      Rate and Rhythm: Normal rate and regular rhythm. Pulses: Normal pulses. Heart sounds: Normal heart sounds. No murmur heard. No friction rub. No gallop. Pulmonary:      Effort: Pulmonary effort is normal. No respiratory distress. Breath sounds: Normal breath sounds. No wheezing, rhonchi or rales. Chest:      Chest wall: No tenderness. Abdominal:      General: Bowel sounds are normal. There is no distension. Palpations: Abdomen is soft. There is no mass. Tenderness: There is no abdominal tenderness. There is no guarding or rebound. Hernia: No hernia is present. Musculoskeletal:         General: No tenderness. Normal range of motion. Cervical back: Normal range of motion and neck supple. Right lower leg: No edema. Left lower leg: No edema. Lymphadenopathy:      Cervical: No cervical adenopathy. Skin:     General: Skin is warm and dry. Findings: No erythema or rash. Neurological:      General: No focal deficit present. Mental Status: He is alert and oriented to person, place, and time. Cranial Nerves: No cranial nerve deficit. Motor: No abnormal muscle tone. Deep Tendon Reflexes: Reflexes are normal and symmetric. Reflexes normal.      Comments: Cranial nerves II through XII intact sensory and motor.   Deep tendon reflexes in the biceps triceps knee and ankle are normal and bilaterally symmetrical.   Psychiatric:         Mood and Affect: Mood normal.         Behavior: Behavior normal.         Thought Content: Thought content normal.         Judgment: Judgment normal.           ICD-10-CM ICD-9-CM    1. Controlled type 2 diabetes mellitus without complication, without long-term current use of insulin (HCC)  E11.9 250.00 LIPID PANEL      METABOLIC PANEL, COMPREHENSIVE      MICROALBUMIN, UR, RAND W/ MICROALB/CREAT RATIO      COLLECTION VENOUS BLOOD,VENIPUNCTURE       DIABETES FOOT EXAM      TSH 3RD GENERATION   2. Major depressive disorder with single episode, in full remission [facility]  F32.5 296.26    3. Encounter for hepatitis C screening test for low risk patient  Z11.59 V73.89 HEPATITIS C AB   4. Essential hypertension  I10 401.9 CBC WITH AUTOMATED DIFF   5. Hypercholesterolemia  E78.00 272.0    6. Benign familial tremor  G25.0 333.1        Orders Placed This Encounter    LIPID PANEL     Standing Status:   Future     Standing Expiration Date:   0/49/0737    METABOLIC PANEL, COMPREHENSIVE     Standing Status:   Future     Standing Expiration Date:   8/27/2021    MICROALBUMIN, UR, RAND W/ MICROALB/CREAT RATIO     Standing Status:   Future     Standing Expiration Date:   8/27/2021    HEPATITIS C AB     Standing Status:   Future     Standing Expiration Date:   7/27/2022    TSH 3RD GENERATION     Standing Status:   Future     Standing Expiration Date:   7/27/2022    CBC WITH AUTOMATED DIFF     Standing Status:   Future     Standing Expiration Date:   7/27/2022     DIABETES FOOT EXAM    COLLECTION VENOUS BLOOD,VENIPUNCTURE    aspirin (Dottie Chewable Aspirin) 81 mg chewable tablet     Sig: Take 81 mg by mouth daily.     zolpidem CR (AMBIEN CR) 12.5 mg tablet     Sig: TAKE 1 TABLET BY MOUTH ONCE A DAY AT BEDTIME AS NEEDED   Pt with wnl DM foot exam without any orthopedic deformity with normal pulsations in the dorsalis pedis and posterior tibial arteries bilaterally and normal neurological examination including monofilament and light touch and noting no pre-ulcerative callus formation nor any pressure ulcers and noting the skin is intact without any infection. Follow-up and Dispositions    · Return in about 6 months (around 1/27/2022), or if symptoms worsen or fail to improve.          Cb King MD

## 2021-07-27 NOTE — PROGRESS NOTES
1. Have you been to the ER, urgent care clinic since your last visit? Hospitalized since your last visit? Yes When: ER 6-10-21     2. Have you seen or consulted any other health care providers outside of the [address] [place] [address] since your last visit? Include any pap smears or colon screening.  Yes When: Dermatology 7-2021

## 2021-07-28 LAB
ALBUMIN SERPL-MCNC: 4.3 G/DL (ref 3.5–5)
ALBUMIN/GLOB SERPL: 1.2 {RATIO} (ref 1.1–2.2)
ALP SERPL-CCNC: 126 U/L (ref 45–117)
ALT SERPL-CCNC: 44 U/L (ref 12–78)
ANION GAP SERPL CALC-SCNC: 6 MMOL/L (ref 5–15)
AST SERPL-CCNC: 35 U/L (ref 15–37)
BASOPHILS # BLD: 0.1 K/UL (ref 0–0.1)
BASOPHILS NFR BLD: 1 % (ref 0–1)
BILIRUB SERPL-MCNC: 0.8 MG/DL (ref 0.2–1)
BUN SERPL-MCNC: 15 MG/DL (ref 6–20)
BUN/CREAT SERPL: 16 (ref 12–20)
CALCIUM SERPL-MCNC: 10.3 MG/DL (ref 8.5–10.1)
CHLORIDE SERPL-SCNC: 101 MMOL/L (ref 97–108)
CHOLEST SERPL-MCNC: 150 MG/DL
CO2 SERPL-SCNC: 26 MMOL/L (ref 21–32)
CREAT SERPL-MCNC: 0.94 MG/DL (ref 0.7–1.3)
DIFFERENTIAL METHOD BLD: ABNORMAL
EOSINOPHIL # BLD: 0.1 K/UL (ref 0–0.4)
EOSINOPHIL NFR BLD: 1 % (ref 0–7)
ERYTHROCYTE [DISTWIDTH] IN BLOOD BY AUTOMATED COUNT: 13.4 % (ref 11.5–14.5)
GLOBULIN SER CALC-MCNC: 3.5 G/DL (ref 2–4)
GLUCOSE SERPL-MCNC: 262 MG/DL (ref 65–100)
HCT VFR BLD AUTO: 41.8 % (ref 36.6–50.3)
HCV AB SERPL QL IA: NONREACTIVE
HCV COMMENT,HCGAC: NORMAL
HDLC SERPL-MCNC: 35 MG/DL
HDLC SERPL: 4.3 {RATIO} (ref 0–5)
HGB BLD-MCNC: 14.2 G/DL (ref 12.1–17)
IMM GRANULOCYTES # BLD AUTO: 0.1 K/UL (ref 0–0.04)
IMM GRANULOCYTES NFR BLD AUTO: 1 % (ref 0–0.5)
LDLC SERPL CALC-MCNC: 61 MG/DL (ref 0–100)
LYMPHOCYTES # BLD: 1.9 K/UL (ref 0.8–3.5)
LYMPHOCYTES NFR BLD: 17 % (ref 12–49)
MCH RBC QN AUTO: 33.5 PG (ref 26–34)
MCHC RBC AUTO-ENTMCNC: 34 G/DL (ref 30–36.5)
MCV RBC AUTO: 98.6 FL (ref 80–99)
MONOCYTES # BLD: 0.6 K/UL (ref 0–1)
MONOCYTES NFR BLD: 5 % (ref 5–13)
NEUTS SEG # BLD: 8.8 K/UL (ref 1.8–8)
NEUTS SEG NFR BLD: 75 % (ref 32–75)
NRBC # BLD: 0 K/UL (ref 0–0.01)
NRBC BLD-RTO: 0 PER 100 WBC
PLATELET # BLD AUTO: 277 K/UL (ref 150–400)
PMV BLD AUTO: 12 FL (ref 8.9–12.9)
POTASSIUM SERPL-SCNC: 5.4 MMOL/L (ref 3.5–5.1)
PROT SERPL-MCNC: 7.8 G/DL (ref 6.4–8.2)
RBC # BLD AUTO: 4.24 M/UL (ref 4.1–5.7)
SODIUM SERPL-SCNC: 133 MMOL/L (ref 136–145)
TRIGL SERPL-MCNC: 270 MG/DL (ref ?–150)
TSH SERPL DL<=0.05 MIU/L-ACNC: 1.38 UIU/ML (ref 0.36–3.74)
VLDLC SERPL CALC-MCNC: 54 MG/DL
WBC # BLD AUTO: 11.5 K/UL (ref 4.1–11.1)

## 2022-03-19 PROBLEM — F10.10 ETOH ABUSE: Status: ACTIVE | Noted: 2021-06-10

## 2022-03-19 PROBLEM — E11.9 CONTROLLED TYPE 2 DIABETES MELLITUS WITHOUT COMPLICATION, WITHOUT LONG-TERM CURRENT USE OF INSULIN (HCC): Status: ACTIVE | Noted: 2021-06-10

## 2022-03-19 PROBLEM — F32.A DEPRESSION: Status: ACTIVE | Noted: 2021-06-12

## 2022-03-19 PROBLEM — E78.00 HYPERCHOLESTEROLEMIA: Status: ACTIVE | Noted: 2021-06-10

## 2022-03-19 PROBLEM — I10 ESSENTIAL HYPERTENSION: Status: ACTIVE | Noted: 2021-07-27

## 2022-03-20 PROBLEM — G25.0 BENIGN FAMILIAL TREMOR: Status: ACTIVE | Noted: 2021-06-10

## 2023-12-27 NOTE — PROGRESS NOTES
Care Management Interventions  PCP Verified by CM: No (Pt does NOT Have a PCP )  Palliative Care Criteria Met (RRAT>21 & CHF Dx)?: No (NO MD order)  Mode of Transport at Discharge: Other (see comment) (Wife POV )  Transition of Care Consult (CM Consult): Discharge Planning  Physical Therapy Consult: No  Occupational Therapy Consult: No  Speech Therapy Consult: No  Current Support Network: Lives with Spouse  Confirm Follow Up Transport: Family  The Plan for Transition of Care is Related to the Following Treatment Goals : Treat DKA   Discharge Location  Discharge Placement: Home    Patient is being discharged home today. He states he does not need any home health or other services. Patient aware and in agreement with discharge plan for today. Instructed patient to contact care management if he has any questions or concerns. He states understanding. RUR: 14% LOW      Transition of Care (NOE) Plan:  Home     NOE Transportation:       How is patient being transported at discharge? Spouse POV      When? Today      Is transport scheduled? N/a     Follow-up appointment and transportation:     PCP? Florida Batista MD 7/27/21 at 9:40am      Who is transporting to the follow-up appointment? POV       Is transport for follow up appointment scheduled? N/a     Communication plan (with patient/family): patient aware of dc for today and agrees      Who is being called? Patient or Next of Kin? Responsible party? Patient       What number(s) is to be used? [phone]           What service provider is calling for [facility] services? [facility]       When are they calling? 24--48 hours prior to appointment.        Click here to complete [address] including selection of the Healthcare Decision Maker Relationship (ie \"Primary\")  [email or web address] N/A